# Patient Record
Sex: FEMALE | Race: ASIAN | NOT HISPANIC OR LATINO | Employment: UNEMPLOYED
[De-identification: names, ages, dates, MRNs, and addresses within clinical notes are randomized per-mention and may not be internally consistent; named-entity substitution may affect disease eponyms.]

---
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
## 2018-03-09 ENCOUNTER — LAB SERVICES (OUTPATIENT)
Dept: OTHER | Age: 18
End: 2018-03-09

## 2018-03-09 ENCOUNTER — CHARTING TRANS (OUTPATIENT)
Dept: OTHER | Age: 18
End: 2018-03-09

## 2018-03-10 ENCOUNTER — LAB SERVICES (OUTPATIENT)
Dept: OTHER | Age: 18
End: 2018-03-10

## 2018-03-13 ENCOUNTER — MYAURORA ACCOUNT LINK (OUTPATIENT)
Dept: OTHER | Age: 18
End: 2018-03-13

## 2018-03-13 ENCOUNTER — LAB SERVICES (OUTPATIENT)
Dept: OTHER | Age: 18
End: 2018-03-13

## 2018-03-13 ENCOUNTER — CHARTING TRANS (OUTPATIENT)
Dept: OTHER | Age: 18
End: 2018-03-13

## 2018-03-13 LAB
APPEARANCE: CLEAR
BILIRUBIN: NORMAL
COLOR: YELLOW
GLUCOSE U: NORMAL
KETONES: NORMAL
LEUKOCYTE ESTERASE: NORMAL
NITRITE: NORMAL
OCCULT BLOOD: NORMAL
PH: NORMAL
PROTEIN: NORMAL
URINE SPEC GRAVITY: 1.01
UROBILINOGEN: 3.2

## 2018-03-20 LAB
ABO + RH BLD: ABNORMAL
BACTERIA UR CULT: NORMAL
BASOPHILS # BLD: 0 K/MCL (ref 0–0.3)
BASOPHILS NFR BLD: 0 %
BLD GP AB SCN SERPL QL: NEGATIVE
C TRACH RRNA SPEC QL NAA+PROBE: NEGATIVE
C TRACH RRNA SPEC QL NAA+PROBE: NEGATIVE
DIFFERENTIAL METHOD BLD: ABNORMAL
EOSINOPHIL # BLD: 0.1 K/MCL (ref 0.1–0.5)
EOSINOPHIL NFR BLD: 2 %
ERYTHROCYTE [DISTWIDTH] IN BLOOD: 15.1 % (ref 11–15)
HBV SURFACE AG SER QL: NEGATIVE
HEMATOCRIT: 35.4 % (ref 36–46.5)
HEMOGLOBIN: 11.5 G/DL (ref 12–15.5)
HIV 1+2 AB+HIV1 P24 AG SERPL QL IA: NONREACTIVE
LYMPHOCYTES # BLD: 1.8 K/MCL (ref 1.2–5.2)
LYMPHOCYTES NFR BLD: 24 %
MEAN CORPUSCULAR HEMOGLOBIN: 26.6 PG (ref 26–34)
MEAN CORPUSCULAR HGB CONC: 32.5 G/DL (ref 32–36.5)
MEAN CORPUSCULAR VOLUME: 81.8 FL (ref 78–100)
MONOCYTES # BLD: 0.7 K/MCL (ref 0.3–0.9)
MONOCYTES NFR BLD: 9 %
N GONORRHOEA RRNA SPEC QL NAA+PROBE: NEGATIVE
N GONORRHOEA RRNA SPEC QL NAA+PROBE: NEGATIVE
NEUTROPHILS # BLD: 4.8 K/MCL (ref 1.8–8)
NEUTROPHILS NFR BLD: 65 %
PLATELET COUNT: 275 K/MCL (ref 140–450)
RED CELL COUNT: 4.33 MIL/MCL (ref 4–5.2)
RUBV IGG SERPL IA-ACNC: 341.4 UNITS/ML
SPECIMEN SOURCE: NORMAL
SPECIMEN SOURCE: NORMAL
T PALLIDUM IGG SER QL IA: NONREACTIVE
VZV IGG SER IA-ACNC: ABNORMAL
WHITE BLOOD COUNT: 7.4 K/MCL (ref 4.2–11)

## 2018-04-11 ENCOUNTER — LAB SERVICES (OUTPATIENT)
Dept: OTHER | Age: 18
End: 2018-04-11

## 2018-04-11 ENCOUNTER — CHARTING TRANS (OUTPATIENT)
Dept: OTHER | Age: 18
End: 2018-04-11

## 2018-04-23 LAB
APPEARANCE: NEGATIVE
BILIRUBIN: NEGATIVE
COLOR: NEGATIVE
GLUCOSE U: NEGATIVE
KETONES: NEGATIVE
LEUKOCYTE ESTERASE: NEGATIVE
NITRITE: NEGATIVE
OCCULT BLOOD: NORMAL
PH: 5.5
PROTEIN: NEGATIVE
URINE SPEC GRAVITY: 1
UROBILINOGEN: 3.2

## 2018-05-11 ENCOUNTER — LAB SERVICES (OUTPATIENT)
Dept: OTHER | Age: 18
End: 2018-05-11

## 2018-05-11 ENCOUNTER — CHARTING TRANS (OUTPATIENT)
Dept: OTHER | Age: 18
End: 2018-05-11

## 2018-05-14 LAB
APPEARANCE: CLEAR
BACTERIA UR CULT: NORMAL
BILIRUBIN: NEGATIVE
COLOR: YELLOW
GLUCOSE U: NEGATIVE
KETONES: NEGATIVE
LEUKOCYTE ESTERASE: NORMAL
NITRITE: NORMAL
OCCULT BLOOD: NORMAL
PH: 6
PROTEIN: NEGATIVE
URINE SPEC GRAVITY: 1.01
UROBILINOGEN: 3.2

## 2018-06-12 ENCOUNTER — CHARTING TRANS (OUTPATIENT)
Dept: OTHER | Age: 18
End: 2018-06-12

## 2018-06-12 ENCOUNTER — LAB SERVICES (OUTPATIENT)
Dept: OTHER | Age: 18
End: 2018-06-12

## 2018-06-12 LAB
APPEARANCE: CLEAR
BILIRUBIN: NORMAL
COLOR: YELLOW
GLUCOSE U: NORMAL
KETONES: NORMAL
LEUKOCYTES: NORMAL
NITRITE: NORMAL
OCCULT BLOOD: NORMAL
PH: 7
PROTEIN: NORMAL
URINE SPEC GRAVITY: 1.02
UROBILINOGEN: 3.2

## 2018-07-13 ENCOUNTER — CHARTING TRANS (OUTPATIENT)
Dept: OTHER | Age: 18
End: 2018-07-13

## 2018-07-13 ENCOUNTER — MYAURORA ACCOUNT LINK (OUTPATIENT)
Dept: OTHER | Age: 18
End: 2018-07-13

## 2018-07-14 ENCOUNTER — LAB SERVICES (OUTPATIENT)
Dept: OTHER | Age: 18
End: 2018-07-14

## 2018-07-16 LAB
APPEARANCE: CLEAR
BASOPHILS # BLD: 0 K/MCL (ref 0–0.3)
BASOPHILS NFR BLD: 0 %
BILIRUBIN: NORMAL
COLOR: YELLOW
DIFFERENTIAL METHOD BLD: ABNORMAL
EOSINOPHIL # BLD: 0.1 K/MCL (ref 0.1–0.5)
EOSINOPHIL NFR BLD: 1 %
ERYTHROCYTE [DISTWIDTH] IN BLOOD: 14.1 % (ref 11–15)
GLUCOSE 1H P 50 G GLC PO SERPL-MCNC: 96 MG/DL (ref 65–139)
GLUCOSE U: NORMAL
HEMATOCRIT: 33.3 % (ref 36–46.5)
HEMOGLOBIN: 10.5 G/DL (ref 12–15.5)
IMM GRANULOCYTES # BLD AUTO: 0.8 K/MCL (ref 0–0.2)
IMM GRANULOCYTES NFR BLD: 6 %
KETONES: NORMAL
LEUKOCYTE ESTERASE: NORMAL
LYMPHOCYTES # BLD: 2.2 K/MCL (ref 1.2–5.2)
LYMPHOCYTES NFR BLD: 15 %
MEAN CORPUSCULAR HEMOGLOBIN: 28.5 PG (ref 26–34)
MEAN CORPUSCULAR HGB CONC: 31.5 G/DL (ref 32–36.5)
MEAN CORPUSCULAR VOLUME: 90.2 FL (ref 78–100)
MONOCYTES # BLD: 0.8 K/MCL (ref 0.3–0.9)
MONOCYTES NFR BLD: 6 %
NEUTROPHILS # BLD: 10.4 K/MCL (ref 1.8–8)
NEUTROPHILS NFR BLD: 72 %
NITRITE: NORMAL
NRBC (NRBCRE): 0 /100 WBC
OCCULT BLOOD: NORMAL
PH: 6
PLATELET COUNT: 232 K/MCL (ref 140–450)
PROTEIN: NORMAL
RED CELL COUNT: 3.69 MIL/MCL (ref 4–5.2)
URINE SPEC GRAVITY: <=1.005
UROBILINOGEN: 3.2
WHITE BLOOD COUNT: 14.3 K/MCL (ref 4.2–11)

## 2018-08-13 ENCOUNTER — CHARTING TRANS (OUTPATIENT)
Dept: OTHER | Age: 18
End: 2018-08-13

## 2018-08-14 LAB
BILIRUBIN: NORMAL
GLUCOSE U: NORMAL
KETONES: NORMAL
LEUKOCYTE ESTERASE: NORMAL
NITRITE: NORMAL
OCCULT BLOOD: NORMAL
PH: 6
PROTEIN: NORMAL
URINE SPEC GRAVITY: 1.02
UROBILINOGEN: 3.2

## 2018-08-27 ENCOUNTER — CHARTING TRANS (OUTPATIENT)
Dept: OTHER | Age: 18
End: 2018-08-27

## 2018-08-27 ENCOUNTER — LAB SERVICES (OUTPATIENT)
Dept: OTHER | Age: 18
End: 2018-08-27

## 2018-08-27 LAB
APPEARANCE: NORMAL
BILIRUBIN: NORMAL
COLOR: YELLOW
GLUCOSE U: NORMAL
KETONES: NORMAL
LEUKOCYTES: NORMAL
NITRITE: NORMAL
OCCULT BLOOD: NORMAL
PH: 6
PROTEIN: NORMAL
URINE SPEC GRAVITY: 1
UROBILINOGEN: 3.2

## 2018-08-30 LAB — BACTERIA UR CULT: NORMAL

## 2018-09-11 ENCOUNTER — CHARTING TRANS (OUTPATIENT)
Dept: OTHER | Age: 18
End: 2018-09-11

## 2018-09-11 ENCOUNTER — LAB SERVICES (OUTPATIENT)
Dept: OTHER | Age: 18
End: 2018-09-11

## 2018-09-11 LAB
APPEARANCE: CLEAR
BILIRUBIN: NORMAL
COLOR: YELLOW
GLUCOSE U: NORMAL
KETONES: NORMAL
LEUKOCYTE ESTERASE: NORMAL
NITRITE: NORMAL
OCCULT BLOOD: NORMAL
PH: 6.5
PROTEIN: NORMAL
URINE SPEC GRAVITY: 1.01
UROBILINOGEN: 3.2

## 2018-09-13 LAB
BACTERIA UR CULT: NORMAL
BASOPHIL: 0 %
BASOPHILS # BLD: 0 K/MCL (ref 0–0.3)
C TRACH RRNA SPEC QL NAA+PROBE: NEGATIVE
DIFFERENTIAL METHOD BLD: ABNORMAL
EOSINOPHIL # BLD: 0.3 K/MCL (ref 0.1–0.5)
EOSINOPHIL NFR BLD: 2 %
ERYTHROCYTE [DISTWIDTH] IN BLOOD: 14 % (ref 11–15)
HEMATOCRIT: 36.2 % (ref 36–46.5)
HEMOGLOBIN: 11.5 G/DL (ref 12–15.5)
HIV 1+2 AB+HIV1 P24 AG SERPL QL IA: NONREACTIVE
LYMPHOCYTES # BLD: 1.8 K/MCL (ref 1.2–5.2)
LYMPHOCYTES NFR BLD: 13 %
MEAN CORPUSCULAR HEMOGLOBIN: 28.5 PG (ref 26–34)
MEAN CORPUSCULAR HGB CONC: 31.8 G/DL (ref 32–36.5)
MEAN CORPUSCULAR VOLUME: 89.6 FL (ref 78–100)
MONOCYTES # BLD: 0.9 K/MCL (ref 0.3–0.9)
MONOCYTES NFR BLD: 6 %
N GONORRHOEA RRNA SPEC QL NAA+PROBE: NEGATIVE
NEUTROPHILS # BLD: 11.2 K/MCL (ref 1.8–8)
NEUTS BAND NFR BLD: 3 % (ref 0–10)
NEUTS SEG NFR BLD: 76 %
NRBC (NRBCRE): 0 /100 WBC
PLAT MORPH BLD: NORMAL
PLATELET COUNT: 238 K/MCL (ref 140–450)
RBC MORPH BLD: NORMAL
RED CELL COUNT: 4.04 MIL/MCL (ref 4–5.2)
RPR SER QL: NONREACTIVE
SPECIMEN SOURCE: NORMAL
WHITE BLOOD COUNT: 14.2 K/MCL (ref 4.2–11)

## 2018-09-26 ENCOUNTER — LAB SERVICES (OUTPATIENT)
Dept: OTHER | Age: 18
End: 2018-09-26

## 2018-09-26 ENCOUNTER — CHARTING TRANS (OUTPATIENT)
Dept: OTHER | Age: 18
End: 2018-09-26

## 2018-09-26 LAB
BILIRUBIN: NEGATIVE
COLOR: YELLOW
GLUCOSE U: NEGATIVE
KETONES: NEGATIVE
LEUKOCYTE ESTERASE: NORMAL
NITRITE: NEGATIVE
OCCULT BLOOD: NORMAL
PH: 6
PROTEIN: NEGATIVE
URINE SPEC GRAVITY: 1.01
UROBILINOGEN: 3.2

## 2018-09-28 ENCOUNTER — CHARTING TRANS (OUTPATIENT)
Dept: OTHER | Age: 18
End: 2018-09-28

## 2018-09-30 ENCOUNTER — CHARTING TRANS (OUTPATIENT)
Dept: OTHER | Age: 18
End: 2018-09-30

## 2018-10-01 LAB
BACTERIA UR CULT: NORMAL
GP B STREP CULT/SENS (GBSCS) HL: NORMAL

## 2018-10-05 ENCOUNTER — MYAURORA ACCOUNT LINK (OUTPATIENT)
Dept: OTHER | Age: 18
End: 2018-10-05

## 2018-10-05 ENCOUNTER — CHARTING TRANS (OUTPATIENT)
Dept: OTHER | Age: 18
End: 2018-10-05

## 2018-10-05 ENCOUNTER — LAB SERVICES (OUTPATIENT)
Dept: OTHER | Age: 18
End: 2018-10-05

## 2018-10-12 ENCOUNTER — CHARTING TRANS (OUTPATIENT)
Dept: OTHER | Age: 18
End: 2018-10-12

## 2018-10-12 ENCOUNTER — MYAURORA ACCOUNT LINK (OUTPATIENT)
Dept: OTHER | Age: 18
End: 2018-10-12

## 2018-10-12 ENCOUNTER — LAB SERVICES (OUTPATIENT)
Dept: OTHER | Age: 18
End: 2018-10-12

## 2018-10-17 LAB
BILIRUBIN: NEGATIVE
COLOR: YELLOW
GLUCOSE U: NEGATIVE
KETONES: NEGATIVE
LEUKOCYTE ESTERASE: NORMAL
NITRITE: NEGATIVE
OCCULT BLOOD: NORMAL
PH: 7
PROTEIN: NEGATIVE
URINE SPEC GRAVITY: 1.01
UROBILINOGEN: 3.2

## 2018-10-22 ENCOUNTER — CHARTING TRANS (OUTPATIENT)
Dept: OTHER | Age: 18
End: 2018-10-22

## 2018-10-22 ENCOUNTER — LAB SERVICES (OUTPATIENT)
Dept: OTHER | Age: 18
End: 2018-10-22

## 2018-10-28 ENCOUNTER — HOSPITAL (OUTPATIENT)
Dept: OTHER | Age: 18
End: 2018-10-28
Attending: FAMILY MEDICINE

## 2018-10-29 ENCOUNTER — CHARTING TRANS (OUTPATIENT)
Dept: OTHER | Age: 18
End: 2018-10-29

## 2018-10-29 ENCOUNTER — HOSPITAL (OUTPATIENT)
Dept: OTHER | Age: 18
End: 2018-10-29
Attending: OBSTETRICS & GYNECOLOGY

## 2018-10-29 ENCOUNTER — MYAURORA ACCOUNT LINK (OUTPATIENT)
Dept: OTHER | Age: 18
End: 2018-10-29

## 2018-10-29 ENCOUNTER — LAB SERVICES (OUTPATIENT)
Dept: OTHER | Age: 18
End: 2018-10-29

## 2018-10-29 LAB
ANALYZER ANC (IANC): ABNORMAL
APPEARANCE UR: CLEAR
APPEARANCE: CLEAR
BILIRUB UR QL STRIP: NEGATIVE
BILIRUBIN: NORMAL
COLOR UR: YELLOW
COLOR: YELLOW
ERYTHROCYTE [DISTWIDTH] IN BLOOD: 13.7 % (ref 11–15)
GLUCOSE U: NORMAL
GLUCOSE UR STRIP-MCNC: NEGATIVE MG/DL
HEMATOCRIT: 39 % (ref 36–46.5)
HEMOCCULT STL QL: ABNORMAL
HGB BLD-MCNC: 12.6 GM/DL (ref 12–15.5)
KETONES UR STRIP-MCNC: NEGATIVE MG/DL
KETONES: NORMAL
LEUKOCYTE ESTERASE UR QL STRIP: ABNORMAL
LEUKOCYTE ESTERASE: NORMAL
MCH RBC QN AUTO: 28.4 PG (ref 26–34)
MCHC RBC AUTO-ENTMCNC: 32.3 GM/DL (ref 32–36.5)
MCV RBC AUTO: 88 FL (ref 78–100)
NITRITE UR QL STRIP: NEGATIVE
NITRITE: NORMAL
NRBC (NRBCRE): 0 /100 WBC
OCCULT BLOOD: NORMAL
PH UR STRIP: 6.5 UNIT (ref 5–7)
PH: 7
PLATELET # BLD: 206 THOUSAND/MCL (ref 140–450)
PROT UR STRIP-MCNC: NEGATIVE MG/DL
PROTEIN: NORMAL
RBC # BLD: 4.43 MILLION/MCL (ref 4–5.2)
SP GR UR STRIP: 1.01 (ref 1–1.03)
URINE SPEC GRAVITY: 1.01
UROBILINOGEN UR STRIP-MCNC: 0.2 MG/DL (ref 0–1)
UROBILINOGEN: 3.2
WBC # BLD: 14.6 THOUSAND/MCL (ref 4.2–11)

## 2018-10-30 LAB
BASE DEFICIT BLDCOV-SCNC: 10 MMOL/L
BASE DEFICIT BLDCOV-SCNC: 8 MMOL/L
BASE EXCESS BLDCOV CALC-SCNC: ABNORMAL MMOL/L
BASE EXCESS BLDCOV CALC-SCNC: ABNORMAL MMOL/L
GLUCOSE BLDC GLUCOMTR-MCNC: 80 MG/DL (ref 65–99)
HCO3 BLDCOA-SCNC: 25 MMOL/L (ref 21–28)
HCO3 BLDCOV-SCNC: 26 MMOL/L (ref 22–29)
PCO2 BLDCOA: 100 MM HG (ref 31–74)
PCO2 BLDCOV: 96 MM HG (ref 23–49)
PH BLDCOA: 7.03 UNIT (ref 7.18–7.38)
PH BLDCOV: 7.07 UNIT (ref 7.25–7.45)
PO2 BLDCOA: <25 MM HG (ref 6–31)
PO2 BLDCOV: <25 MM HG (ref 17–41)

## 2018-10-31 VITALS
HEIGHT: 63 IN | WEIGHT: 147 LBS | SYSTOLIC BLOOD PRESSURE: 110 MMHG | BODY MASS INDEX: 26.05 KG/M2 | TEMPERATURE: 97 F | DIASTOLIC BLOOD PRESSURE: 73 MMHG | HEART RATE: 94 BPM

## 2018-10-31 LAB
ANALYZER ANC (IANC): ABNORMAL
ERYTHROCYTE [DISTWIDTH] IN BLOOD: 13.8 % (ref 11–15)
HEMATOCRIT: 29.8 % (ref 36–46.5)
HGB BLD-MCNC: 9.6 GM/DL (ref 12–15.5)
MCH RBC QN AUTO: 28.5 PG (ref 26–34)
MCHC RBC AUTO-ENTMCNC: 32.2 GM/DL (ref 32–36.5)
MCV RBC AUTO: 88.4 FL (ref 78–100)
NRBC (NRBCRE): 0 /100 WBC
PLATELET # BLD: 186 THOUSAND/MCL (ref 140–450)
RBC # BLD: 3.37 MILLION/MCL (ref 4–5.2)
WBC # BLD: 19.5 THOUSAND/MCL (ref 4.2–11)

## 2018-11-01 VITALS
WEIGHT: 132 LBS | TEMPERATURE: 97.4 F | DIASTOLIC BLOOD PRESSURE: 72 MMHG | HEIGHT: 63 IN | BODY MASS INDEX: 23.39 KG/M2 | HEART RATE: 99 BPM | SYSTOLIC BLOOD PRESSURE: 107 MMHG

## 2018-11-01 VITALS
SYSTOLIC BLOOD PRESSURE: 116 MMHG | WEIGHT: 130 LBS | HEIGHT: 63 IN | HEART RATE: 97 BPM | DIASTOLIC BLOOD PRESSURE: 72 MMHG | BODY MASS INDEX: 23.04 KG/M2 | TEMPERATURE: 97.8 F

## 2018-11-01 VITALS
BODY MASS INDEX: 23.21 KG/M2 | HEIGHT: 63 IN | WEIGHT: 131 LBS | HEART RATE: 87 BPM | DIASTOLIC BLOOD PRESSURE: 64 MMHG | TEMPERATURE: 98 F | SYSTOLIC BLOOD PRESSURE: 107 MMHG

## 2018-11-01 VITALS
DIASTOLIC BLOOD PRESSURE: 75 MMHG | HEART RATE: 92 BPM | TEMPERATURE: 97.6 F | WEIGHT: 140 LBS | SYSTOLIC BLOOD PRESSURE: 112 MMHG

## 2018-11-01 VITALS
HEART RATE: 80 BPM | SYSTOLIC BLOOD PRESSURE: 109 MMHG | TEMPERATURE: 97.8 F | WEIGHT: 127 LBS | DIASTOLIC BLOOD PRESSURE: 70 MMHG

## 2018-11-01 LAB
ANALYZER ANC (IANC): ABNORMAL
BASOPHILS # BLD: 0 THOUSAND/MCL (ref 0–0.3)
BASOPHILS NFR BLD: 0 %
DIFFERENTIAL METHOD BLD: ABNORMAL
EOSINOPHIL # BLD: 0.3 THOUSAND/MCL (ref 0.1–0.5)
EOSINOPHIL NFR BLD: 2 %
ERYTHROCYTE [DISTWIDTH] IN BLOOD: 13.9 % (ref 11–15)
HEMATOCRIT: 31.7 % (ref 36–46.5)
HGB BLD-MCNC: 9.9 GM/DL (ref 12–15.5)
IMM GRANULOCYTES # BLD AUTO: 0.2 THOUSAND/MCL (ref 0–0.2)
IMM GRANULOCYTES NFR BLD: 2 %
LYMPHOCYTES # BLD: 1.9 THOUSAND/MCL (ref 1.2–5.2)
LYMPHOCYTES NFR BLD: 14 %
MCH RBC QN AUTO: 28.3 PG (ref 26–34)
MCHC RBC AUTO-ENTMCNC: 31.2 GM/DL (ref 32–36.5)
MCV RBC AUTO: 90.6 FL (ref 78–100)
MONOCYTES # BLD: 0.7 THOUSAND/MCL (ref 0.3–0.9)
MONOCYTES NFR BLD: 5 %
NEUTROPHILS # BLD: 10.3 THOUSAND/MCL (ref 1.8–8)
NEUTROPHILS NFR BLD: 77 %
NEUTS SEG NFR BLD: ABNORMAL %
NRBC (NRBCRE): 0 /100 WBC
PLATELET # BLD: 209 THOUSAND/MCL (ref 140–450)
RBC # BLD: 3.5 MILLION/MCL (ref 4–5.2)
WBC # BLD: 13.5 THOUSAND/MCL (ref 4.2–11)

## 2018-11-02 VITALS
WEIGHT: 116 LBS | HEIGHT: 63 IN | SYSTOLIC BLOOD PRESSURE: 100 MMHG | BODY MASS INDEX: 20.55 KG/M2 | HEART RATE: 83 BPM | TEMPERATURE: 97.2 F | DIASTOLIC BLOOD PRESSURE: 62 MMHG

## 2018-11-02 VITALS
TEMPERATURE: 98.1 F | HEART RATE: 75 BPM | SYSTOLIC BLOOD PRESSURE: 106 MMHG | DIASTOLIC BLOOD PRESSURE: 60 MMHG | RESPIRATION RATE: 12 BRPM

## 2018-11-20 ENCOUNTER — CHARTING TRANS (OUTPATIENT)
Dept: OTHER | Age: 18
End: 2018-11-20

## 2018-11-27 VITALS
DIASTOLIC BLOOD PRESSURE: 68 MMHG | TEMPERATURE: 97.2 F | BODY MASS INDEX: 29.27 KG/M2 | WEIGHT: 165.2 LBS | HEART RATE: 97 BPM | HEIGHT: 63 IN | SYSTOLIC BLOOD PRESSURE: 107 MMHG

## 2018-11-27 VITALS
DIASTOLIC BLOOD PRESSURE: 73 MMHG | BODY MASS INDEX: 27.84 KG/M2 | HEART RATE: 109 BPM | SYSTOLIC BLOOD PRESSURE: 117 MMHG | HEIGHT: 63 IN | TEMPERATURE: 98.1 F | WEIGHT: 157.13 LBS

## 2018-11-27 VITALS
HEART RATE: 92 BPM | BODY MASS INDEX: 30.3 KG/M2 | HEIGHT: 63 IN | WEIGHT: 171 LBS | SYSTOLIC BLOOD PRESSURE: 114 MMHG | TEMPERATURE: 98.3 F | DIASTOLIC BLOOD PRESSURE: 72 MMHG

## 2018-11-27 VITALS
TEMPERATURE: 96.9 F | DIASTOLIC BLOOD PRESSURE: 76 MMHG | WEIGHT: 169 LBS | HEART RATE: 96 BPM | SYSTOLIC BLOOD PRESSURE: 117 MMHG

## 2018-11-27 VITALS
TEMPERATURE: 97 F | HEART RATE: 80 BPM | SYSTOLIC BLOOD PRESSURE: 105 MMHG | DIASTOLIC BLOOD PRESSURE: 69 MMHG | WEIGHT: 171 LBS

## 2018-11-27 VITALS
DIASTOLIC BLOOD PRESSURE: 71 MMHG | HEIGHT: 63 IN | SYSTOLIC BLOOD PRESSURE: 113 MMHG | WEIGHT: 167 LBS | BODY MASS INDEX: 29.59 KG/M2 | TEMPERATURE: 96.8 F | HEART RATE: 87 BPM

## 2018-11-27 VITALS
HEIGHT: 63 IN | HEART RATE: 89 BPM | DIASTOLIC BLOOD PRESSURE: 69 MMHG | TEMPERATURE: 96.9 F | WEIGHT: 170 LBS | BODY MASS INDEX: 30.12 KG/M2 | SYSTOLIC BLOOD PRESSURE: 107 MMHG

## 2018-11-27 VITALS
TEMPERATURE: 96.6 F | WEIGHT: 160 LBS | SYSTOLIC BLOOD PRESSURE: 119 MMHG | HEART RATE: 102 BPM | DIASTOLIC BLOOD PRESSURE: 76 MMHG

## 2018-12-10 ENCOUNTER — TELEPHONE (OUTPATIENT)
Dept: SCHEDULING | Age: 18
End: 2018-12-10

## 2018-12-13 PROBLEM — Z3A.40 40 WEEKS GESTATION OF PREGNANCY: Status: ACTIVE | Noted: 2018-07-16

## 2018-12-13 PROBLEM — Z28.39 MATERNAL VARICELLA, NON-IMMUNE: Status: ACTIVE | Noted: 2018-03-20

## 2018-12-13 PROBLEM — O09.899 MATERNAL VARICELLA, NON-IMMUNE: Status: ACTIVE | Noted: 2018-03-20

## 2018-12-13 RX ORDER — ALBUTEROL SULFATE 90 UG/1
2 AEROSOL, METERED RESPIRATORY (INHALATION) EVERY 4 HOURS PRN
COMMUNITY
Start: 2018-04-11 | End: 2019-04-11

## 2018-12-13 ASSESSMENT — ENCOUNTER SYMPTOMS
CONSTIPATION: 0
ABDOMINAL PAIN: 0
DIZZINESS: 0
SHORTNESS OF BREATH: 0
FEVER: 0
SLEEP DISTURBANCE: 0
VOMITING: 0
HALLUCINATIONS: 0
HEADACHES: 0
COLOR CHANGE: 0
NAUSEA: 0
FATIGUE: 0
CHILLS: 0
DIARRHEA: 0
BLOOD IN STOOL: 0
WOUND: 0

## 2018-12-14 ENCOUNTER — POSTPARTUM VISIT (OUTPATIENT)
Dept: FAMILY MEDICINE | Age: 18
End: 2018-12-14

## 2018-12-14 VITALS
TEMPERATURE: 97.5 F | WEIGHT: 145 LBS | HEART RATE: 72 BPM | SYSTOLIC BLOOD PRESSURE: 102 MMHG | BODY MASS INDEX: 24.16 KG/M2 | HEIGHT: 65 IN | DIASTOLIC BLOOD PRESSURE: 67 MMHG

## 2018-12-14 PROCEDURE — 0503F POSTPARTUM CARE VISIT: CPT | Performed by: FAMILY MEDICINE

## 2018-12-14 PROCEDURE — 96127 BRIEF EMOTIONAL/BEHAV ASSMT: CPT | Performed by: FAMILY MEDICINE

## 2018-12-14 SDOH — HEALTH STABILITY: MENTAL HEALTH: HOW OFTEN DO YOU HAVE A DRINK CONTAINING ALCOHOL?: NEVER

## 2018-12-27 ENCOUNTER — E-ADVICE (OUTPATIENT)
Dept: FAMILY MEDICINE | Age: 18
End: 2018-12-27

## 2018-12-29 ENCOUNTER — E-ADVICE (OUTPATIENT)
Dept: FAMILY MEDICINE | Age: 18
End: 2018-12-29

## 2018-12-31 ENCOUNTER — TELEPHONE (OUTPATIENT)
Dept: FAMILY MEDICINE | Age: 18
End: 2018-12-31

## 2019-01-01 ENCOUNTER — EXTERNAL RECORD (OUTPATIENT)
Dept: HEALTH INFORMATION MANAGEMENT | Facility: OTHER | Age: 19
End: 2019-01-01

## 2019-01-17 ENCOUNTER — E-ADVICE (OUTPATIENT)
Dept: FAMILY MEDICINE | Age: 19
End: 2019-01-17

## 2019-02-05 ENCOUNTER — TELEPHONE (OUTPATIENT)
Dept: SCHEDULING | Age: 19
End: 2019-02-05

## 2019-02-07 RX ORDER — PNV NO.95/FERROUS FUM/FOLIC AC 28MG-0.8MG
TABLET ORAL
COMMUNITY
End: 2020-01-17 | Stop reason: ALTCHOICE

## 2019-02-11 PROBLEM — N93.9 VAGINAL BLEEDING: Status: ACTIVE | Noted: 2019-02-11

## 2019-03-25 ENCOUNTER — E-ADVICE (OUTPATIENT)
Dept: FAMILY MEDICINE | Age: 19
End: 2019-03-25

## 2019-03-25 DIAGNOSIS — N93.9 VAGINAL BLEEDING: Primary | ICD-10-CM

## 2019-03-26 ENCOUNTER — TELEPHONE (OUTPATIENT)
Dept: FAMILY MEDICINE | Age: 19
End: 2019-03-26

## 2019-03-27 ENCOUNTER — E-ADVICE (OUTPATIENT)
Dept: FAMILY MEDICINE | Age: 19
End: 2019-03-27

## 2019-03-27 ENCOUNTER — TELEPHONE (OUTPATIENT)
Dept: FAMILY MEDICINE | Age: 19
End: 2019-03-27

## 2019-03-27 ENCOUNTER — APPOINTMENT (OUTPATIENT)
Dept: FAMILY MEDICINE | Age: 19
End: 2019-03-27

## 2019-05-16 ENCOUNTER — TELEPHONE (OUTPATIENT)
Dept: FAMILY MEDICINE | Age: 19
End: 2019-05-16

## 2019-05-28 ENCOUNTER — TELEPHONE (OUTPATIENT)
Dept: SCHEDULING | Age: 19
End: 2019-05-28

## 2019-06-24 ENCOUNTER — TELEPHONE (OUTPATIENT)
Dept: SCHEDULING | Age: 19
End: 2019-06-24

## 2019-06-24 ENCOUNTER — WALK IN (OUTPATIENT)
Dept: URGENT CARE | Age: 19
End: 2019-06-24

## 2019-06-24 DIAGNOSIS — H60.502 ACUTE OTITIS EXTERNA OF LEFT EAR, UNSPECIFIED TYPE: Primary | ICD-10-CM

## 2019-06-24 PROCEDURE — 99214 OFFICE O/P EST MOD 30 MIN: CPT | Performed by: NURSE PRACTITIONER

## 2019-06-24 RX ORDER — OFLOXACIN 3 MG/ML
10 SOLUTION AURICULAR (OTIC) DAILY
Qty: 5 ML | Refills: 0 | Status: SHIPPED | OUTPATIENT
Start: 2019-06-24 | End: 2019-07-01

## 2019-06-24 ASSESSMENT — ENCOUNTER SYMPTOMS
TROUBLE SWALLOWING: 0
UNEXPECTED WEIGHT CHANGE: 0
PSYCHIATRIC NEGATIVE: 1
DIAPHORESIS: 0
GASTROINTESTINAL NEGATIVE: 1
HEMATOLOGIC/LYMPHATIC NEGATIVE: 1
CONSTITUTIONAL NEGATIVE: 1
FATIGUE: 0
NEUROLOGICAL NEGATIVE: 1
SORE THROAT: 0
FACIAL SWELLING: 0
CHILLS: 0
SINUS PRESSURE: 0
VOICE CHANGE: 0
RESPIRATORY NEGATIVE: 1
SINUS PAIN: 0
RHINORRHEA: 0
EYES NEGATIVE: 1

## 2019-06-24 ASSESSMENT — PAIN SCALES - GENERAL: PAINLEVEL: 5-6

## 2019-07-02 ENCOUNTER — E-ADVICE (OUTPATIENT)
Dept: FAMILY MEDICINE | Age: 19
End: 2019-07-02

## 2019-07-03 ENCOUNTER — E-ADVICE (OUTPATIENT)
Dept: FAMILY MEDICINE | Age: 19
End: 2019-07-03

## 2020-01-16 ENCOUNTER — E-ADVICE (OUTPATIENT)
Dept: FAMILY MEDICINE | Age: 20
End: 2020-01-16

## 2020-01-17 ENCOUNTER — OFFICE VISIT (OUTPATIENT)
Dept: INTERNAL MEDICINE | Age: 20
End: 2020-01-17

## 2020-01-17 VITALS — DIASTOLIC BLOOD PRESSURE: 54 MMHG | SYSTOLIC BLOOD PRESSURE: 97 MMHG | HEART RATE: 71 BPM

## 2020-01-17 DIAGNOSIS — N93.9 VAGINAL BLEEDING: Primary | ICD-10-CM

## 2020-01-17 DIAGNOSIS — D64.9 ANEMIA, UNSPECIFIED TYPE: ICD-10-CM

## 2020-01-17 PROCEDURE — 99213 OFFICE O/P EST LOW 20 MIN: CPT | Performed by: STUDENT IN AN ORGANIZED HEALTH CARE EDUCATION/TRAINING PROGRAM

## 2021-05-26 VITALS
DIASTOLIC BLOOD PRESSURE: 60 MMHG | RESPIRATION RATE: 16 BRPM | BODY MASS INDEX: 23.07 KG/M2 | WEIGHT: 138.45 LBS | TEMPERATURE: 97.9 F | HEIGHT: 65 IN | HEART RATE: 75 BPM | SYSTOLIC BLOOD PRESSURE: 98 MMHG

## 2021-10-20 ENCOUNTER — TELEPHONE (OUTPATIENT)
Dept: SCHEDULING | Age: 21
End: 2021-10-20

## 2021-10-21 ENCOUNTER — APPOINTMENT (OUTPATIENT)
Dept: INTERNAL MEDICINE | Age: 21
End: 2021-10-21

## 2021-10-22 ENCOUNTER — E-ADVICE (OUTPATIENT)
Dept: FAMILY MEDICINE | Age: 21
End: 2021-10-22

## 2021-10-22 ENCOUNTER — TELEPHONE (OUTPATIENT)
Dept: FAMILY MEDICINE | Age: 21
End: 2021-10-22

## 2021-10-22 ENCOUNTER — TELEPHONE (OUTPATIENT)
Dept: SCHEDULING | Age: 21
End: 2021-10-22

## 2021-10-22 ENCOUNTER — OFFICE VISIT (OUTPATIENT)
Dept: FAMILY MEDICINE | Age: 21
End: 2021-10-22

## 2021-10-22 VITALS
HEART RATE: 79 BPM | SYSTOLIC BLOOD PRESSURE: 124 MMHG | TEMPERATURE: 97.9 F | DIASTOLIC BLOOD PRESSURE: 76 MMHG | HEIGHT: 65 IN | OXYGEN SATURATION: 98 % | BODY MASS INDEX: 24.83 KG/M2 | WEIGHT: 149 LBS

## 2021-10-22 DIAGNOSIS — O20.9 VAGINAL BLEEDING IN PREGNANCY, FIRST TRIMESTER: Primary | ICD-10-CM

## 2021-10-22 PROBLEM — Z3A.40 40 WEEKS GESTATION OF PREGNANCY: Status: RESOLVED | Noted: 2018-07-16 | Resolved: 2021-10-22

## 2021-10-22 PROCEDURE — 87491 CHLMYD TRACH DNA AMP PROBE: CPT | Performed by: PSYCHIATRY & NEUROLOGY

## 2021-10-22 PROCEDURE — 99213 OFFICE O/P EST LOW 20 MIN: CPT | Performed by: FAMILY MEDICINE

## 2021-10-22 PROCEDURE — 87661 TRICHOMONAS VAGINALIS AMPLIF: CPT | Performed by: PSYCHIATRY & NEUROLOGY

## 2021-10-22 PROCEDURE — 87624 HPV HI-RISK TYP POOLED RSLT: CPT | Performed by: CLINICAL MEDICAL LABORATORY

## 2021-10-22 PROCEDURE — 90471 IMMUNIZATION ADMIN: CPT | Performed by: FAMILY MEDICINE

## 2021-10-22 PROCEDURE — 87591 N.GONORRHOEAE DNA AMP PROB: CPT | Performed by: PSYCHIATRY & NEUROLOGY

## 2021-10-22 PROCEDURE — 87086 URINE CULTURE/COLONY COUNT: CPT | Performed by: FAMILY MEDICINE

## 2021-10-22 PROCEDURE — 88175 CYTOPATH C/V AUTO FLUID REDO: CPT | Performed by: CLINICAL MEDICAL LABORATORY

## 2021-10-22 PROCEDURE — 90686 IIV4 VACC NO PRSV 0.5 ML IM: CPT | Performed by: FAMILY MEDICINE

## 2021-10-22 PROCEDURE — 81001 URINALYSIS AUTO W/SCOPE: CPT | Performed by: FAMILY MEDICINE

## 2021-10-22 ASSESSMENT — PAIN SCALES - GENERAL: PAINLEVEL: 2

## 2021-10-22 ASSESSMENT — PATIENT HEALTH QUESTIONNAIRE - PHQ9
1. LITTLE INTEREST OR PLEASURE IN DOING THINGS: NOT AT ALL
SUM OF ALL RESPONSES TO PHQ9 QUESTIONS 1 AND 2: 0
2. FEELING DOWN, DEPRESSED OR HOPELESS: NOT AT ALL
SUM OF ALL RESPONSES TO PHQ9 QUESTIONS 1 AND 2: 0
CLINICAL INTERPRETATION OF PHQ2 SCORE: NO FURTHER SCREENING NEEDED
CLINICAL INTERPRETATION OF PHQ9 SCORE: NO FURTHER SCREENING NEEDED
SUM OF ALL RESPONSES TO PHQ9 QUESTIONS 1 AND 2: 0

## 2021-10-23 LAB
AMORPH SED URNS QL MICRO: PRESENT
APPEARANCE UR: ABNORMAL
BACTERIA #/AREA URNS HPF: ABNORMAL /HPF
BILIRUB UR QL STRIP: NEGATIVE
COLOR UR: YELLOW
GLUCOSE UR STRIP-MCNC: NEGATIVE MG/DL
HGB UR QL STRIP: ABNORMAL
HYALINE CASTS #/AREA URNS LPF: ABNORMAL /LPF
KETONES UR STRIP-MCNC: NEGATIVE MG/DL
LEUKOCYTE ESTERASE UR QL STRIP: NEGATIVE
MUCOUS THREADS URNS QL MICRO: PRESENT
NITRITE UR QL STRIP: NEGATIVE
PH UR STRIP: 7 [PH] (ref 5–7)
PROT UR STRIP-MCNC: NEGATIVE MG/DL
RBC #/AREA URNS HPF: ABNORMAL /HPF
SP GR UR STRIP: 1.02 (ref 1–1.03)
SQUAMOUS #/AREA URNS HPF: ABNORMAL /HPF
UROBILINOGEN UR STRIP-MCNC: 0.2 MG/DL
WBC #/AREA URNS HPF: ABNORMAL /HPF

## 2021-10-24 LAB — BACTERIA UR CULT: NORMAL

## 2021-10-25 ENCOUNTER — LAB SERVICES (OUTPATIENT)
Dept: LAB | Age: 21
End: 2021-10-25

## 2021-10-25 ENCOUNTER — OB CHECK (OUTPATIENT)
Dept: FAMILY MEDICINE | Age: 21
End: 2021-10-25

## 2021-10-25 ENCOUNTER — TELEPHONE (OUTPATIENT)
Dept: FAMILY MEDICINE | Age: 21
End: 2021-10-25

## 2021-10-25 VITALS
HEART RATE: 79 BPM | HEIGHT: 65 IN | DIASTOLIC BLOOD PRESSURE: 60 MMHG | BODY MASS INDEX: 24.66 KG/M2 | TEMPERATURE: 97.8 F | SYSTOLIC BLOOD PRESSURE: 115 MMHG | WEIGHT: 148 LBS

## 2021-10-25 DIAGNOSIS — O20.9 VAGINAL BLEEDING IN PREGNANCY, FIRST TRIMESTER: ICD-10-CM

## 2021-10-25 DIAGNOSIS — Z3A.09 9 WEEKS GESTATION OF PREGNANCY: Primary | ICD-10-CM

## 2021-10-25 LAB
ABO + RH BLD: NORMAL
APPEARANCE, POC: ABNORMAL
BASOPHILS # BLD: 0 K/MCL (ref 0–0.3)
BASOPHILS NFR BLD: 0 %
BILIRUBIN, POC: NEGATIVE
BLD GP AB SCN SERPL QL GEL: NEGATIVE
C TRACH RRNA CVX QL NAA+PROBE: NEGATIVE
COLOR, POC: YELLOW
DEPRECATED RDW RBC: 43.8 FL (ref 39–50)
EOSINOPHIL # BLD: 0.1 K/MCL (ref 0–0.5)
EOSINOPHIL NFR BLD: 1 %
ERYTHROCYTE [DISTWIDTH] IN BLOOD: 14.1 % (ref 11–15)
GLUCOSE UR-MCNC: NEGATIVE MG/DL
HBV SURFACE AG SER QL: NEGATIVE
HCT VFR BLD CALC: 38.2 % (ref 36–46.5)
HCV AB SER QL: NEGATIVE
HGB BLD-MCNC: 12 G/DL (ref 12–15.5)
HIV 1+2 AB+HIV1 P24 AG SERPL QL IA: NONREACTIVE
IMM GRANULOCYTES # BLD AUTO: 0 K/MCL (ref 0–0.2)
IMM GRANULOCYTES # BLD: 1 %
KETONES, POC: NEGATIVE MG/DL
LYMPHOCYTES # BLD: 2.2 K/MCL (ref 1–4.8)
LYMPHOCYTES NFR BLD: 25 %
Lab: NORMAL
MCH RBC QN AUTO: 26.8 PG (ref 26–34)
MCHC RBC AUTO-ENTMCNC: 31.4 G/DL (ref 32–36.5)
MCV RBC AUTO: 85.3 FL (ref 78–100)
MONOCYTES # BLD: 0.6 K/MCL (ref 0.3–0.9)
MONOCYTES NFR BLD: 7 %
N GONORRHOEA RRNA CVX QL NAA+PROBE: NEGATIVE
NEUTROPHILS # BLD: 5.7 K/MCL (ref 1.8–7.7)
NEUTROPHILS NFR BLD: 66 %
NITRITE, POC: NEGATIVE
NRBC BLD MANUAL-RTO: 0 /100 WBC
OCCULT BLOOD, POC: ABNORMAL
PH UR: 8.5 [PH] (ref 5–7)
PLATELET # BLD AUTO: 255 K/MCL (ref 140–450)
PROT UR-MCNC: NEGATIVE MG/DL
RBC # BLD: 4.48 MIL/MCL (ref 4–5.2)
RUBV IGG SERPL IA-ACNC: 295.6 UNITS/ML
SP GR UR: 1.02 (ref 1–1.03)
T VAGINALIS RRNA CVX QL NAA+PROBE: NEGATIVE
UROBILINOGEN UR-MCNC: 3.5 MG/DL (ref 0–1)
WBC # BLD: 8.6 K/MCL (ref 4.2–11)
WBC (LEUKOCYTE) ESTERASE, POC: ABNORMAL

## 2021-10-25 PROCEDURE — 85025 COMPLETE CBC W/AUTO DIFF WBC: CPT | Performed by: PSYCHIATRY & NEUROLOGY

## 2021-10-25 PROCEDURE — 86901 BLOOD TYPING SEROLOGIC RH(D): CPT | Performed by: PSYCHIATRY & NEUROLOGY

## 2021-10-25 PROCEDURE — 87086 URINE CULTURE/COLONY COUNT: CPT | Performed by: PSYCHIATRY & NEUROLOGY

## 2021-10-25 PROCEDURE — 81003 URINALYSIS AUTO W/O SCOPE: CPT | Performed by: STUDENT IN AN ORGANIZED HEALTH CARE EDUCATION/TRAINING PROGRAM

## 2021-10-25 PROCEDURE — 86787 VARICELLA-ZOSTER ANTIBODY: CPT | Performed by: PSYCHIATRY & NEUROLOGY

## 2021-10-25 PROCEDURE — 36415 COLL VENOUS BLD VENIPUNCTURE: CPT | Performed by: FAMILY MEDICINE

## 2021-10-25 PROCEDURE — 86762 RUBELLA ANTIBODY: CPT | Performed by: PSYCHIATRY & NEUROLOGY

## 2021-10-25 PROCEDURE — 86900 BLOOD TYPING SEROLOGIC ABO: CPT | Performed by: PSYCHIATRY & NEUROLOGY

## 2021-10-25 PROCEDURE — 87340 HEPATITIS B SURFACE AG IA: CPT | Performed by: PSYCHIATRY & NEUROLOGY

## 2021-10-25 PROCEDURE — 99213 OFFICE O/P EST LOW 20 MIN: CPT | Performed by: STUDENT IN AN ORGANIZED HEALTH CARE EDUCATION/TRAINING PROGRAM

## 2021-10-25 PROCEDURE — 86803 HEPATITIS C AB TEST: CPT | Performed by: PSYCHIATRY & NEUROLOGY

## 2021-10-25 PROCEDURE — 87389 HIV-1 AG W/HIV-1&-2 AB AG IA: CPT | Performed by: PSYCHIATRY & NEUROLOGY

## 2021-10-25 PROCEDURE — 86780 TREPONEMA PALLIDUM: CPT | Performed by: PSYCHIATRY & NEUROLOGY

## 2021-10-25 PROCEDURE — 86850 RBC ANTIBODY SCREEN: CPT | Performed by: PSYCHIATRY & NEUROLOGY

## 2021-10-26 LAB — T PALLIDUM IGG SER QL IA: NONREACTIVE

## 2021-10-27 LAB
BACTERIA UR CULT: NORMAL
VZV IGG SER IA-ACNC: ABNORMAL

## 2021-10-28 ENCOUNTER — TELEPHONE (OUTPATIENT)
Dept: SCHEDULING | Age: 21
End: 2021-10-28

## 2021-11-01 ENCOUNTER — OB CHECK (OUTPATIENT)
Dept: FAMILY MEDICINE | Age: 21
End: 2021-11-01

## 2021-11-01 ENCOUNTER — OFFICE VISIT (OUTPATIENT)
Dept: MATERNAL FETAL MEDICINE | Age: 21
End: 2021-11-01

## 2021-11-01 VITALS
BODY MASS INDEX: 24.79 KG/M2 | HEART RATE: 64 BPM | WEIGHT: 149 LBS | DIASTOLIC BLOOD PRESSURE: 62 MMHG | SYSTOLIC BLOOD PRESSURE: 99 MMHG | TEMPERATURE: 97.3 F

## 2021-11-01 DIAGNOSIS — O34.219 PREVIOUS CESAREAN DELIVERY, ANTEPARTUM CONDITION OR COMPLICATION: Primary | ICD-10-CM

## 2021-11-01 DIAGNOSIS — O26.841 UTERINE SIZE DATE DISCREPANCY PREGNANCY, FIRST TRIMESTER: ICD-10-CM

## 2021-11-01 DIAGNOSIS — Z3A.08 8 WEEKS GESTATION OF PREGNANCY: ICD-10-CM

## 2021-11-01 DIAGNOSIS — Z3A.09 9 WEEKS GESTATION OF PREGNANCY: Primary | ICD-10-CM

## 2021-11-01 LAB
APPEARANCE, POC: ABNORMAL
BILIRUBIN, POC: NEGATIVE
CASE RPRT: NORMAL
COLOR, POC: YELLOW
CYTOLOGY CVX/VAG STUDY: NORMAL
GLUCOSE UR-MCNC: NEGATIVE MG/DL
HPV16+18+45 E6+E7MRNA CVX NAA+PROBE: NEGATIVE
KETONES, POC: NEGATIVE MG/DL
Lab: NORMAL
NITRITE, POC: NEGATIVE
OCCULT BLOOD, POC: ABNORMAL
PAP EDUCATIONAL NOTE: NORMAL
PH UR: 7 [PH] (ref 5–7)
PROT UR-MCNC: NEGATIVE MG/DL
SP GR UR: 1.01 (ref 1–1.03)
SPECIMEN ADEQUACY: NORMAL
UROBILINOGEN UR-MCNC: 2 MG/DL (ref 0–1)
WBC (LEUKOCYTE) ESTERASE, POC: ABNORMAL

## 2021-11-01 PROCEDURE — 87086 URINE CULTURE/COLONY COUNT: CPT | Performed by: PSYCHIATRY & NEUROLOGY

## 2021-11-01 PROCEDURE — 81003 URINALYSIS AUTO W/O SCOPE: CPT | Performed by: FAMILY MEDICINE

## 2021-11-01 PROCEDURE — 0500F INITIAL PRENATAL CARE VISIT: CPT | Performed by: FAMILY MEDICINE

## 2021-11-01 PROCEDURE — 76801 OB US < 14 WKS SINGLE FETUS: CPT | Performed by: OBSTETRICS & GYNECOLOGY

## 2021-11-01 ASSESSMENT — ENCOUNTER SYMPTOMS
LIGHT-HEADEDNESS: 0
CONSTIPATION: 0
CHILLS: 0
COLOR CHANGE: 0
SHORTNESS OF BREATH: 0
NUMBNESS: 0
CONFUSION: 0
SLEEP DISTURBANCE: 0
COUGH: 0
BRUISES/BLEEDS EASILY: 0
RHINORRHEA: 0
SEIZURES: 0
WEAKNESS: 0
NAUSEA: 0
ABDOMINAL PAIN: 0
FEVER: 0
SORE THROAT: 0
BACK PAIN: 0
VOMITING: 0
FATIGUE: 0
HEADACHES: 0
DIARRHEA: 0
DIZZINESS: 0
WHEEZING: 0

## 2021-11-03 LAB — BACTERIA UR CULT: NORMAL

## 2021-11-04 ENCOUNTER — TELEPHONE (OUTPATIENT)
Dept: OBGYN | Age: 21
End: 2021-11-04

## 2021-11-10 ENCOUNTER — TELEPHONE (OUTPATIENT)
Dept: SCHEDULING | Age: 21
End: 2021-11-10

## 2021-11-29 ENCOUNTER — OB CHECK (OUTPATIENT)
Dept: FAMILY MEDICINE | Age: 21
End: 2021-11-29

## 2021-11-29 VITALS
TEMPERATURE: 97.1 F | SYSTOLIC BLOOD PRESSURE: 107 MMHG | HEART RATE: 109 BPM | BODY MASS INDEX: 24.79 KG/M2 | DIASTOLIC BLOOD PRESSURE: 69 MMHG | WEIGHT: 149 LBS

## 2021-11-29 DIAGNOSIS — N93.9 VAGINAL BLEEDING: ICD-10-CM

## 2021-11-29 DIAGNOSIS — Z3A.14 14 WEEKS GESTATION OF PREGNANCY: Primary | ICD-10-CM

## 2021-11-29 DIAGNOSIS — Z3A.12 12 WEEKS GESTATION OF PREGNANCY: ICD-10-CM

## 2021-11-29 LAB
APPEARANCE, POC: CLEAR
BILIRUBIN, POC: NEGATIVE
COLOR, POC: YELLOW
GLUCOSE UR-MCNC: NEGATIVE MG/DL
KETONES, POC: NEGATIVE MG/DL
NITRITE, POC: NEGATIVE
OCCULT BLOOD, POC: ABNORMAL
PH UR: 7 [PH] (ref 5–7)
PROT UR-MCNC: NEGATIVE MG/DL
SP GR UR: 1.02 (ref 1–1.03)
UROBILINOGEN UR-MCNC: ABNORMAL MG/DL (ref 0–1)
WBC (LEUKOCYTE) ESTERASE, POC: ABNORMAL

## 2021-11-29 PROCEDURE — 0502F SUBSEQUENT PRENATAL CARE: CPT | Performed by: FAMILY MEDICINE

## 2021-11-29 PROCEDURE — 87086 URINE CULTURE/COLONY COUNT: CPT | Performed by: PSYCHIATRY & NEUROLOGY

## 2021-11-29 PROCEDURE — 81003 URINALYSIS AUTO W/O SCOPE: CPT | Performed by: FAMILY MEDICINE

## 2021-12-01 LAB — BACTERIA UR CULT: NORMAL

## 2021-12-30 ENCOUNTER — OB CHECK (OUTPATIENT)
Dept: FAMILY MEDICINE | Age: 21
End: 2021-12-30

## 2021-12-30 VITALS
HEIGHT: 65 IN | SYSTOLIC BLOOD PRESSURE: 120 MMHG | TEMPERATURE: 97.4 F | WEIGHT: 153.8 LBS | BODY MASS INDEX: 25.62 KG/M2 | DIASTOLIC BLOOD PRESSURE: 68 MMHG | HEART RATE: 86 BPM

## 2021-12-30 DIAGNOSIS — N93.9 VAGINAL BLEEDING: ICD-10-CM

## 2021-12-30 DIAGNOSIS — Z3A.16 16 WEEKS GESTATION OF PREGNANCY: Primary | ICD-10-CM

## 2021-12-30 LAB
APPEARANCE, POC: CLEAR
BILIRUBIN, POC: NEGATIVE
COLOR, POC: YELLOW
GLUCOSE UR-MCNC: NEGATIVE MG/DL
KETONES, POC: NEGATIVE MG/DL
NITRITE, POC: NEGATIVE
OCCULT BLOOD, POC: NORMAL
PH UR: 6 [PH] (ref 5–7)
PROT UR-MCNC: NEGATIVE MG/DL
SP GR UR: 1.01 (ref 1–1.03)
UROBILINOGEN UR-MCNC: NORMAL MG/DL (ref 0–1)
WBC (LEUKOCYTE) ESTERASE, POC: NORMAL

## 2021-12-30 PROCEDURE — 0502F SUBSEQUENT PRENATAL CARE: CPT | Performed by: FAMILY MEDICINE

## 2021-12-30 PROCEDURE — 81003 URINALYSIS AUTO W/O SCOPE: CPT | Performed by: FAMILY MEDICINE

## 2021-12-30 PROCEDURE — 87086 URINE CULTURE/COLONY COUNT: CPT | Performed by: PSYCHIATRY & NEUROLOGY

## 2021-12-30 ASSESSMENT — PAIN SCALES - GENERAL: PAINLEVEL: 0

## 2022-01-01 LAB — BACTERIA UR CULT: NORMAL

## 2022-01-21 ENCOUNTER — APPOINTMENT (OUTPATIENT)
Dept: OBGYN | Age: 22
End: 2022-01-21

## 2022-01-21 ENCOUNTER — TELEPHONE (OUTPATIENT)
Dept: MATERNAL FETAL MEDICINE | Age: 22
End: 2022-01-21

## 2022-01-24 ENCOUNTER — OFFICE VISIT (OUTPATIENT)
Dept: MATERNAL FETAL MEDICINE | Age: 22
End: 2022-01-24

## 2022-01-24 ENCOUNTER — APPOINTMENT (OUTPATIENT)
Dept: MATERNAL FETAL MEDICINE | Age: 22
End: 2022-01-24

## 2022-01-24 DIAGNOSIS — Z13.89 ENCOUNTER FOR ROUTINE SCREENING FOR MALFORMATION USING ULTRASONICS: Primary | ICD-10-CM

## 2022-01-24 DIAGNOSIS — Z3A.20 20 WEEKS GESTATION OF PREGNANCY: ICD-10-CM

## 2022-01-24 PROCEDURE — 76805 OB US >/= 14 WKS SNGL FETUS: CPT | Performed by: OBSTETRICS & GYNECOLOGY

## 2022-01-25 ENCOUNTER — E-ADVICE (OUTPATIENT)
Dept: FAMILY MEDICINE | Age: 22
End: 2022-01-25

## 2022-02-08 ENCOUNTER — APPOINTMENT (OUTPATIENT)
Dept: FAMILY MEDICINE | Age: 22
End: 2022-02-08

## 2022-02-08 ENCOUNTER — OB CHECK (OUTPATIENT)
Dept: FAMILY MEDICINE | Age: 22
End: 2022-02-08

## 2022-02-08 VITALS
BODY MASS INDEX: 26.33 KG/M2 | SYSTOLIC BLOOD PRESSURE: 101 MMHG | TEMPERATURE: 97 F | DIASTOLIC BLOOD PRESSURE: 67 MMHG | HEART RATE: 102 BPM | WEIGHT: 158.2 LBS

## 2022-02-08 DIAGNOSIS — R31.1 BENIGN ESSENTIAL MICROSCOPIC HEMATURIA: ICD-10-CM

## 2022-02-08 DIAGNOSIS — Z13.1 SCREENING FOR DIABETES MELLITUS (DM): Primary | ICD-10-CM

## 2022-02-08 DIAGNOSIS — N93.9 VAGINAL BLEEDING: ICD-10-CM

## 2022-02-08 DIAGNOSIS — Z3A.22 22 WEEKS GESTATION OF PREGNANCY: ICD-10-CM

## 2022-02-08 DIAGNOSIS — Z13.0 SCREENING FOR DEFICIENCY ANEMIA: ICD-10-CM

## 2022-02-08 LAB
BILIRUBIN, POC: NEGATIVE
COLOR, POC: YELLOW
GLUCOSE UR-MCNC: NEGATIVE MG/DL
KETONES, POC: NEGATIVE MG/DL
NITRITE, POC: NEGATIVE
OCCULT BLOOD, POC: NORMAL
PH UR: 6 [PH] (ref 5–7)
PROT UR-MCNC: NEGATIVE MG/DL
SP GR UR: 1.01 (ref 1–1.03)
UROBILINOGEN UR-MCNC: NORMAL MG/DL (ref 0–1)
WBC (LEUKOCYTE) ESTERASE, POC: NORMAL

## 2022-02-08 PROCEDURE — 87086 URINE CULTURE/COLONY COUNT: CPT | Performed by: PSYCHIATRY & NEUROLOGY

## 2022-02-08 PROCEDURE — 59425 ANTEPARTUM CARE ONLY: CPT | Performed by: FAMILY MEDICINE

## 2022-02-08 PROCEDURE — 81002 URINALYSIS NONAUTO W/O SCOPE: CPT | Performed by: FAMILY MEDICINE

## 2022-02-10 LAB — BACTERIA UR CULT: NORMAL

## 2022-02-15 ENCOUNTER — E-ADVICE (OUTPATIENT)
Dept: FAMILY MEDICINE | Age: 22
End: 2022-02-15

## 2022-02-18 ENCOUNTER — TELEPHONE (OUTPATIENT)
Dept: SCHEDULING | Age: 22
End: 2022-02-18

## 2022-02-22 ENCOUNTER — TELEPHONE (OUTPATIENT)
Dept: SCHEDULING | Age: 22
End: 2022-02-22

## 2022-02-24 ENCOUNTER — TELEPHONE (OUTPATIENT)
Dept: SCHEDULING | Age: 22
End: 2022-02-24

## 2022-02-25 ENCOUNTER — TELEPHONE (OUTPATIENT)
Dept: SCHEDULING | Age: 22
End: 2022-02-25

## 2022-03-02 ENCOUNTER — APPOINTMENT (OUTPATIENT)
Dept: OBGYN | Age: 22
End: 2022-03-02

## 2022-03-07 ENCOUNTER — APPOINTMENT (OUTPATIENT)
Dept: FAMILY MEDICINE | Age: 22
End: 2022-03-07

## 2022-03-16 ENCOUNTER — TELEPHONE (OUTPATIENT)
Dept: FAMILY MEDICINE | Age: 22
End: 2022-03-16

## 2022-03-24 ENCOUNTER — E-ADVICE (OUTPATIENT)
Dept: FAMILY MEDICINE | Age: 22
End: 2022-03-24

## 2022-04-11 ENCOUNTER — TELEPHONE (OUTPATIENT)
Dept: FAMILY MEDICINE | Age: 22
End: 2022-04-11

## 2023-07-17 ENCOUNTER — TELEPHONE (OUTPATIENT)
Dept: OBGYN CLINIC | Facility: CLINIC | Age: 23
End: 2023-07-17

## 2023-07-17 NOTE — TELEPHONE ENCOUNTER
Patient calling to initiate prenatal care  LMP 4/13/23  Patient is 7-8 weeks on 6/8/23  Confirmation Ultrasound and Appointment scheduled on   Future Appointments   Date Time Provider Stephan Segura   7/24/2023  2:30 PM [facility] US [facility] [place]   7/24/2023  3:00 PM Cecelia Bergeron MD [facility] [place]   8/14/2023  1:00 PM Anam Villa MD [facility] [place]         Any history of ectopic pregnancy? no  Any history of miscarriage? no  Any medications that you are taking on a regular basis other than prenatal vitamins? No (if not taking prenatal vitamins, encourage patient to start taking.)  Any bleeding since the first day of last LMP and your positive pregnancy test? no    Insurance [employer] N    Pt has not had an prenatal care at this point, scheduled for first available  ultrasound/Dr appt.

## 2023-07-24 ENCOUNTER — ULTRASOUND ENCOUNTER (OUTPATIENT)
Dept: OBGYN CLINIC | Facility: CLINIC | Age: 23
End: 2023-07-24
Payer: COMMERCIAL

## 2023-07-24 ENCOUNTER — OFFICE VISIT (OUTPATIENT)
Dept: OBGYN CLINIC | Facility: CLINIC | Age: 23
End: 2023-07-24
Payer: COMMERCIAL

## 2023-07-24 VITALS — WEIGHT: 131.13 LBS | DIASTOLIC BLOOD PRESSURE: 70 MMHG | HEART RATE: 75 BPM | SYSTOLIC BLOOD PRESSURE: 114 MMHG

## 2023-07-24 DIAGNOSIS — Z32.01 PREGNANCY EXAMINATION OR TEST, POSITIVE RESULT: Primary | ICD-10-CM

## 2023-07-24 DIAGNOSIS — Z34.80 SUPERVISION OF OTHER NORMAL PREGNANCY, ANTEPARTUM: ICD-10-CM

## 2023-07-24 DIAGNOSIS — N91.1 SECONDARY AMENORRHEA: ICD-10-CM

## 2023-07-24 PROBLEM — Z87.59 HISTORY OF CHOLESTASIS DURING PREGNANCY: Status: ACTIVE | Noted: 2023-07-24

## 2023-07-24 PROBLEM — Z98.891 HISTORY OF CESAREAN DELIVERY: Status: ACTIVE | Noted: 2023-07-24

## 2023-07-24 PROBLEM — Z87.19 HISTORY OF CHOLESTASIS DURING PREGNANCY: Status: ACTIVE | Noted: 2023-07-24

## 2023-07-24 PROBLEM — O34.219 HX SUCCESSFUL VBAC (VAGINAL BIRTH AFTER CESAREAN), CURRENTLY PREGNANT (HCC): Status: ACTIVE | Noted: 2023-07-24

## 2023-07-24 PROBLEM — O34.219 HX SUCCESSFUL VBAC (VAGINAL BIRTH AFTER CESAREAN), CURRENTLY PREGNANT: Status: ACTIVE | Noted: 2023-07-24

## 2023-07-24 NOTE — PROGRESS NOTES
[facility]  Obstetrics and Gynecology    Subjective:     Sunil Acuna is a 23year old  female presents with c/o secondary amenorrhea and positive pregnancy test. The patient was recommended to return for further evaluation. The patient reports doing well. Patient's last menstrual period was 2023. Daxa Diop Review of Systems:  General: no complaints per category. See HPI for additional information. Breast: no complaints per category. See HPI for additional information. Respiratory: no complaints per category. See HPI for additional information. Cardiovascular: no complaints per category. See HPI for additional information. GI: no complaints per category. See HPI for additional information. : no complaints per category. See HPI for additional information. Heme: no complaints per category. See HPI for additional information. OB History:   OB History    Para Term  AB Living   3 2 2     2   SAB IAB Ectopic Multiple Live Births           2      # Outcome Date GA Lbr Barney/2nd Weight Sex Delivery Anes PTL Lv   3 Current            2 Term 22 37w0d   F    ZAINA   1 Term 10/30/18 40w0d   F Caesarean   ZAINA      Obstetric Comments   Cholestasis of pregnancy in 2022   PLTCS 10/2018 2/2 arrest of descent (pushed for 4 hours)        Gyne History:  Menarche: 11  Period Cycle (Days): pregnant  Period Duration (Days): na  Period Flow: na  Use of Birth Control (if yes, specify type): None  Hx Prior Abnormal Pap: No  Pap Result Notes: never had pap  Patient's last menstrual period was 2023. Meds:  Prenatal Multivit-Min-Fe-FA (PRENATAL VITAMINS OR), Take by mouth., Disp: , Rfl:     No current facility-administered medications on file prior to visit.       All:  No Known Allergies    PMH:  Past Medical History:   Diagnosis Date    History of cholestasis during pregnancy 2022    History of         PSH:  Past Surgical History:   Procedure Laterality Date History of Present Illness





- Reason for Consult


Consult date: 09/25/19


Requesting physician: Poli Pate





- Chief Complaint


Bilateral lower extremity pain





- History of Present Illness





This is a 75-year-old patient referred by Dr. Pate for chronic pain in lower

back radiating to bilateral lower extremities.  She states that her pain began 3

months ago, after a fall.  She reports pain from her tailbone to bilateral lower

extremities, in a non-radicular fashion.  She does endorse bilateral stocking-

like lower extremity numbness.  She endorses subjective weakness in bilateral 

lower extremities as well.  Patient has been taking medications from primary 

care physician including Lyrica and was recently switched to gabapentin.  She 

was admitted to the hospital for memory loss/altered mental status.  Of note she

is on Plavix for history of stroke, last dose was yesterday, 09/24/2019.  Pain 

management was consulted to see if it was possible to do a lumbar epidural 

steroid injection. 





In addition to above, 13-point review of systems is also negative for chest 

pain, shortness of breath, changes in vision, changes in hearing, new onset 

weakness, abdominal pain, diarrhea, extreme fatigue, malaise, fever, skin ch

anges, homicidal or suicidal ideation, or bowel or bladder incontinence.








Past Medical History


Past Medical History: Diabetes Mellitus, Hyperlipidemia, Hypertension, Memory 

Impairment


History of Any Multi-Drug Resistant Organisms: None Reported


Past Surgical History: Hysterectomy, Tonsillectomy


Additional Past Surgical History / Comment(s): right leg vein stripping


Past Anesthesia/Blood Transfusion Reactions: No Reported Reaction


Past Psychological History: No Psychological Hx Reported


Smoking Status: Former smoker


Past Alcohol Use History: None Reported


Past Drug Use History: None Reported





- Past Family History


  ** Father


Family Medical History: No Reported History





  ** Mother


Family Medical History: No Reported History





Medications and Allergies


                                Home Medications











 Medication  Instructions  Recorded  Confirmed  Type


 


Aspirin 81 mg PO DAILY 09/22/14 09/24/19 History


 


Furosemide [Lasix] 40 mg PO DAILY 09/22/14 09/24/19 History


 


Simvastatin [Zocor] 80 mg PO HS 09/22/14 09/24/19 History


 


Magnesium 200 mg PO BID 12/30/18 09/24/19 History


 


Multivitamins, Thera [Multivitamin 1 tab PO DAILY 12/30/18 09/24/19 History





(formulary)]    


 


Repaglinide 0.5 mg PO BID-W/MEALS 12/30/18 09/24/19 History


 


predniSONE 10 mg PO DAILY 12/30/18 09/24/19 History


 


Carvedilol [Coreg] 12.5 mg PO BID 06/05/19 09/24/19 History


 


Clopidogrel [Plavix] 75 mg PO DAILY 06/05/19 09/24/19 History


 


Cholecalciferol [Vitamin D3] 400 unit PO DAILY 09/24/19 09/24/19 History


 


Furosemide [Lasix] 20 mg PO HS 09/24/19 09/24/19 History








                                    Allergies











Allergy/AdvReac Type Severity Reaction Status Date / Time


 


No Known Allergies Allergy   Verified 09/24/19 19:35














Physical Exam


Vitals: 


                                   Vital Signs











  Temp Pulse Resp BP Pulse Ox


 


 09/25/19 07:00  97.7 F  52 L  16  131/65  91 L


 


 09/25/19 01:42  98.0 F  50 L  18  118/63  94 L


 


 09/24/19 21:20  97.8 F  54 L  14  104/54  92 L








                                Intake and Output











 09/24/19 09/25/19 09/25/19





 22:59 06:59 14:59


 


Output Total 400  


 


Balance -400  


 


Output:   


 


  Urine 400  


 


Other:   


 


  Voiding Method Toilet  Toilet


 


  Weight 82.1 kg  

















Physical exam:


Vital Signs:  Reviewed in EMR


GENERAL: Well appearing, in no acute distress


PSYCH: Mood and affect is appropriate. Awake, alert, and oriented


SKIN: Skin color, texture, turgor normal, no rashes or lesions


HEENT: Normocephalic, atraumatic. EOM intact


CV: 1+ bilateral pedal edema


RESP: Respirations are unlabored, no audible wheezing


GI: Abdomen non-distended


MUSCULOSKELETAL: Bilateral lower extremity strength is grossly 4 out of 5 

symmetric. No atrophy or tone abnormalities are noted.


Lumbar spine: Straight leg raising in the sitting position is negative for 

radicular pain.  Tenderness to palpation over the lumbar spine and paraspinous 

muscles bilaterally.


Extremities: Peripheral joint ROM is full and pain free without obvious 

instability or laxity in all four extremities. No edema or skin discolorations n

oted.


NEUR: Bilateral lower extremity coordination and muscle stretch


reflexes are physiologic and symmetric.  Negative clonus.  Reduced sensation in 

bilateral lower extremities noted in a stocking distribution.  Cranial nerves 

are grossly intact.











Results


Results: 








MRI lumbar spine done at [facility] on 09/25/2019 shows spinal stenosis 

greatest at L4-5 with multilevel degenerative disc disease, foraminal encroach

ment.





CBC & Chem 7: 


                                 09/24/19 17:57





                                 09/24/19 17:57


Labs: 


                  Abnormal Lab Results - Last 24 Hours (Table)











  09/24/19 09/24/19 09/24/19 Range/Units





  17:09 17:57 19:40 


 


Potassium   3.4 L   (3.5-5.1)  mmol/L


 


Carbon Dioxide   34 H   (22-30)  mmol/L


 


BUN   36 H   (7-17)  mg/dL


 


Creatinine   1.45 H   (0.52-1.04)  mg/dL


 


Glucose   168 H   (74-99)  mg/dL


 


POC Glucose (mg/dL)  184 H    (75-99)  mg/dL


 


Ur Leukocyte Esterase    Trace H  (Negative)  


 


Urine WBC    6 H  (0-5)  /hpf


 


Amorphous Sediment    Rare H  (None)  /hpf


 


Urine Bacteria    Occasional H  (None)  /hpf














  09/24/19 09/25/19 Range/Units





  22:53 06:53 


 


Potassium    (3.5-5.1)  mmol/L


 


Carbon Dioxide    (22-30)  mmol/L


 


BUN    (7-17)  mg/dL


 


Creatinine    (0.52-1.04)  mg/dL


 


Glucose    (74-99)  mg/dL


 


POC Glucose (mg/dL)  201 H  101 H  (75-99)  mg/dL


 


Ur Leukocyte Esterase    (Negative)  


 


Urine WBC    (0-5)  /hpf


 


Amorphous Sediment    (None)  /hpf


 


Urine Bacteria    (None)  /hpf








                      Microbiology - Last 24 Hours (Table)











 09/24/19 19:40 Urine Culture - Preliminary





 Urine,Voided 














Assessment and Plan


Plan: 





Assessment:


1.  Lumbar radiculopathy


2.  Lumbar spondylosis


3.  Lumbar degenerative disc disease





Plan:





1. Procedures:  Unfortunately, patient is not a candidate for lumbar epidural 

steroid injection at this time as she is on Plavix, last dose yesterday.  In the

 future, if patient can be off Plavix for 5-7 days, she would likely benefit 

from a lumbar epidural steroid injection.  Encouraged patient to discuss 

referral to [facility] pain clinic with the primary care physician.  We 

can see her as outpatient.





2. Investigations:  MRI lumbar spine reviewed





3. Medications:  Per primary team





8. Disposition: At pain clinic following discharge to discuss possible 

procedures 





PQRS Measure Charge Sheet


PQRS Narrative: 


                                        





Smoking Status                   Former smoker


Do You Want the Pneumonia        Vaccine Up to Date


Vaccine AT THIS TIME?            


Blood Pressure [Left Arm]        131/65


Pain Intensity [Bilateral Leg]   5


Scale Used                       Numeric (1 - 10)








Home Medications: 


Ambulatory Orders





Aspirin 81 mg PO DAILY 09/22/14 


Furosemide [Lasix] 40 mg PO DAILY 09/22/14 


Simvastatin [Zocor] 80 mg PO HS 09/22/14 


Magnesium 200 mg PO BID 12/30/18 


Multivitamins, Thera [Multivitamin (formulary)] 1 tab PO DAILY 12/30/18 


Repaglinide 0.5 mg PO BID-W/MEALS 12/30/18 


predniSONE 10 mg PO DAILY 12/30/18 


Carvedilol [Coreg] 12.5 mg PO BID 06/05/19 


Clopidogrel [Plavix] 75 mg PO DAILY 06/05/19 


Cholecalciferol [Vitamin D3] 400 unit PO DAILY 09/24/19 


Furosemide [Lasix] 20 mg PO HS 09/24/19  DELIVERY+POSTPARTUM CARE  10/2018    CHOLECYSTECTOMY       CLASS  10/2018       Objective:      23  1513   BP: 114/70   Pulse: 75   Weight: 131 lb 2 oz (59.5 kg)         There is no height or weight on file to calculate BMI. General: AAO.NAD.   CVS exam: normal peripheral perfusion  Chest: non-labored breathing, no tachypnea   Abdominal exam: deferred   Pelvic exam: deferred        Imaging:  First Trimester US   GA by LMP: 14w4d   GA by US: 13w5d   FHT: 141 bpm   Impression: Single live IUP. Early viable. Gestational sac, yolk sac and fetal pole seen. Both ovaries wnl. No free fluid. Cardiac activity noted. Cervix appears closed and wnl.    DOC 24 by LMP     Reviewed and electronically signed by: Tamera Mast MD, 23, 3:27 PM        Assessment:     Alvaro Mark is a 23year old  female who presents for secondary amenorrhea and positive pregnancy test    Patient Active Problem List:     History of  delivery     Hx successful  (vaginal birth after ), currently pregnant     History of cholestasis during pregnancy        Plan:     Secondary amenorrhea  - a/w positive pregnancy test  - US noted for single viable IUP at 14w4d   - Pt counseled on safety, diet, exercise, caffiene, tobacco, ETOH, sexual activity, ER precautions, diet, exercise, appropriate otc medication use, substance abuse   - Counseled on taking a PNV with 3.3MN of folic acid   - genetic screening testing d/w patient and family, pt declines invasive diagnostic testing and considering first versus second trimester screening   - advised to follow up to establish prenatal care   - SAB precautions provided   - d/w nausea and vomiting in pregnancy including vitamin B 6 and unisom   - COVID 19 precautions provided, recommend vaccination and booster if/when applicable   - pnl orders placed   Hx of PLTCS  - PCS / arrest of descent in 10/2018  - Hx of  2022  - desires   Hx of cholestasis   - hx of cholecystectomy following delivery   - continue to monitor     All of the findings and plan were discussed with the patient. She notes understanding and agrees with the plan of care. All questions were answered to the best of my ability at this time. Total patient time was 30 minutes in evaluation, consultation, and coordination of care. This included face to face and non-face to face actions. The patient's questions and concerns that were addressed. RTC in 4 weeks or sooner if needed     Andres Pastor MD   [facility] - OBGYN     Discussed with patient that there will not be further notification of normal or benign results other than receiving results on [facility]. A [facility] message or telephone call will be placed by the physician and/or office staff if results are abnormal.         Note to patient and family   The [facility] makes medical notes available to patients in the interest of transparency. However, please be advised that this is a medical document. It is intended as ebwk-gq-uppq communication. It is written and medical language may contain abbreviations or verbiage that are technical and unfamiliar. It may appear blunt or direct. Medical documents are intended to carry relevant information, facts as evident, and the clinical opinion of the practitioner. This note could include assistance by [employer] recognition.  Errors in content may be related to improper recognition by the system; efforts to review and correct have been done but errors may still exist.

## 2023-08-04 ENCOUNTER — TELEPHONE (OUTPATIENT)
Dept: OBGYN CLINIC | Facility: CLINIC | Age: 23
End: 2023-08-04

## 2023-08-04 ENCOUNTER — PATIENT MESSAGE (OUTPATIENT)
Dept: OBGYN CLINIC | Facility: CLINIC | Age: 23
End: 2023-08-04

## 2023-08-04 DIAGNOSIS — Z34.80 SUPERVISION OF OTHER NORMAL PREGNANCY, ANTEPARTUM: Primary | ICD-10-CM

## 2023-08-04 NOTE — TELEPHONE ENCOUNTER
[facility] prenatal lab orders placed    Notified pt's spouse.      Faxed via [employer] to [address] [place] [address] at [phone]

## 2023-08-04 NOTE — TELEPHONE ENCOUNTER
From: Elis Patterson  To: Michael Erwin MD  Sent: 8/4/2023 10:26 AM CDT  Subject: Blood Work Order To [employer] Team,    Can you please send/FAX my blood work/order to the below ASAP:    [facility]. [address]    The labs under  [facility] are not covered by our insurance. So i need to get these done from different labs which is in-network to our insurance plan.     Thanks,  Tequila Ryder ([phone])

## 2023-08-04 NOTE — TELEPHONE ENCOUNTER
Patient spouse called to [facility].  Changed to [facility]. Please reorder all labs under [facility].

## 2023-08-09 LAB
ABSOLUTE BASOPHILS: 26 CELLS/UL (ref 0–200)
ABSOLUTE EOSINOPHILS: 113 CELLS/UL (ref 15–500)
ABSOLUTE LYMPHOCYTES: 2149 CELLS/UL (ref 850–3900)
ABSOLUTE MONOCYTES: 461 CELLS/UL (ref 200–950)
ABSOLUTE NEUTROPHILS: 5951 CELLS/UL (ref 1500–7800)
BASOPHILS: 0.3 %
EOSINOPHILS: 1.3 %
HEMATOCRIT: 32.9 % (ref 35–45)
HEMOGLOBIN: 10.7 G/DL (ref 11.7–15.5)
LYMPHOCYTES: 24.7 %
MCH: 27.8 PG (ref 27–33)
MCHC: 32.5 G/DL (ref 32–36)
MCV: 85.5 FL (ref 80–100)
MONOCYTES: 5.3 %
MPV: 12.7 FL (ref 7.5–12.5)
NEUTROPHILS: 68.4 %
PLATELET COUNT: 218 THOUSAND/UL (ref 140–400)
RDW: 14.5 % (ref 11–15)
RED BLOOD CELL COUNT: 3.85 MILLION/UL (ref 3.8–5.1)
RUBELLA ANTIBODY (IGG): 13.8 INDEX
WHITE BLOOD CELL COUNT: 8.7 THOUSAND/UL (ref 3.8–10.8)

## 2023-08-14 ENCOUNTER — INITIAL PRENATAL (OUTPATIENT)
Dept: OBGYN CLINIC | Facility: CLINIC | Age: 23
End: 2023-08-14
Payer: COMMERCIAL

## 2023-08-14 VITALS — DIASTOLIC BLOOD PRESSURE: 68 MMHG | WEIGHT: 129 LBS | SYSTOLIC BLOOD PRESSURE: 110 MMHG | HEART RATE: 78 BPM

## 2023-08-14 DIAGNOSIS — Z36.9 ANTENATAL SCREENING ENCOUNTER: ICD-10-CM

## 2023-08-14 DIAGNOSIS — Z12.4 SCREENING FOR CERVICAL CANCER: ICD-10-CM

## 2023-08-14 DIAGNOSIS — Z11.3 SCREEN FOR STD (SEXUALLY TRANSMITTED DISEASE): ICD-10-CM

## 2023-08-14 DIAGNOSIS — Z34.90 PREGNANCY, UNSPECIFIED GESTATIONAL AGE: Primary | ICD-10-CM

## 2023-08-14 DIAGNOSIS — Z34.90 PRENATAL CARE, ANTEPARTUM: ICD-10-CM

## 2023-08-14 LAB
GLUCOSE (URINE DIPSTICK): NEGATIVE MG/DL
MULTISTIX LOT#: NORMAL NUMERIC
PROTEIN (URINE DIPSTICK): NEGATIVE MG/DL

## 2023-08-14 PROCEDURE — 3078F DIAST BP <80 MM HG: CPT | Performed by: OBSTETRICS & GYNECOLOGY

## 2023-08-14 PROCEDURE — 87491 CHLMYD TRACH DNA AMP PROBE: CPT | Performed by: OBSTETRICS & GYNECOLOGY

## 2023-08-14 PROCEDURE — 81002 URINALYSIS NONAUTO W/O SCOPE: CPT | Performed by: OBSTETRICS & GYNECOLOGY

## 2023-08-14 PROCEDURE — 87591 N.GONORRHOEAE DNA AMP PROB: CPT | Performed by: OBSTETRICS & GYNECOLOGY

## 2023-08-14 PROCEDURE — 3074F SYST BP LT 130 MM HG: CPT | Performed by: OBSTETRICS & GYNECOLOGY

## 2023-08-14 NOTE — PROGRESS NOTES
Subjective:  Patient presents with:  Prenatal Care: New Ob    20year old  female    presents for new OB visit    Patient's last menstrual period was 2023. Estimated Date of Delivery: 24   Patient without complaints since finding out she is pregnant. She is taking an OTC PNV with DHA. She has no concerns regarding this pregnancy. Previous pregnancies uncomplicated  section for arrest of descent, then . No hypertension,  labor or diabetes. Hx of cholestasis. OB History    Para Term  AB Living   3 2 2     2   SAB IAB Ectopic Multiple Live Births           2      # Outcome Date GA Lbr Barney/2nd Weight Sex Delivery Anes PTL Lv   3 Current            2 Term 22 37w0d   F    ZAINA   1 Term 10/30/18 40w0d   F Caesarean   ZAINA      Obstetric Comments   Cholestasis of pregnancy in 2022   PLTCS 10/2018 2/2 arrest of descent (pushed for 4 hours)        Past Medical History:   Diagnosis Date    History of cholestasis during pregnancy 2022    History of       Past Surgical History:   Procedure Laterality Date     DELIVERY+POSTPARTUM CARE  10/2018    CHOLECYSTECTOMY       CLASS  10/2018       Medications:  Prenatal Multivit-Min-Fe-FA (PRENATAL VITAMINS OR), Take by mouth., Disp: , Rfl:     No current facility-administered medications on file prior to visit. Allergies:  No Known Allergies    Social History:  Social History    Tobacco Use      Smoking status: Never      Smokeless tobacco: Never    Alcohol use: Not Currently      Family History:  History reviewed. No pertinent family history. Review of Systems:  Pertinent items are noted in HPI.     Objective:  /68   Pulse 78   Wt 129 lb (58.5 kg)   LMP 2023    Physical Examination:  General appearance: Well dressed, well nourished in no apparent distress  Neurologic/Psychiatric: Alert and oriented to person, place and time, mood normal, affect appropriate  Head: Normocephalic without obvious deformity, atraumatic  Neck: No thyromegaly, supple, non-tender, no masses, no adenopathy  Lungs: Clear to auscultation bilaterally, no rales, wheezes or rhonchi  Breasts: Declined  Heart[de-identified] Regular rate and rhythm, no gallops or murmurs  Abdomen: Soft, non-tender, non-distended, no masses, no hepatosplenomegaly, no hernias, no inguinal lymphadenopathy  Pelvic:    External genitalia- Normal, Bartholin's, urethra, skeins glands normal   Vagina- No vaginal lesions, no discharge   Cervix- No lesions, long/closed, no cervical motion tenderness   Uterus- 17 week sized, non-tender, no masses   Adnexa-  Non-tender, no masses   Pelvimetry- Normal pelvimetry, normal arch  Extremities: Non-tender, full range of motion, no clubbing, cyanosis or edema  Skin:  General inspection- no rashes, lesions or discoloration      Assessment:  20year old  EGA 17w4d DOC 24  Amenorrhea, positive pregnancy test.  Addressed patients concerns regarding pregnancy. Current problems for this pregnancy:   Hx of previous CS and previous successful - Risks, benefits and potential complications of TOLAC reviewed with patient including hemorrhage with need for blood transfusion, hysterectomy, lack of oxygen to fetus and inability of emergent  section to prevent such complications. All questions answered and patient agrees to proceed with trial of labor. Consent form reviewed and signed. Hx of cholestasis- increased recurrence risk    Plan:  Pap smear/STD screen obtained. Order for prenatal labs given. Patient consents to HIV testing, counseled and cleared for release of HIV test results to patient. Discussed optional prenatal screening tests including cfDNA, CF/carrier screen, NT/first trimester screen, Quad Screen/AFP, Level II ultrasound and amniocentesis/CVS.  Declines all of the above. Prenatal vitamins with DHA. New OB education completed.   Vaccine recommendations reviewed including Pertussis 26-37 weeks, flu vaccine any trimester, Covid vaccine and booster. Discussed diet, exercise, travel, food and medication safety. Information sheets on prenatal screening tests, vaccine recommendations, nausea in pregnancy and prenatal care given. Diagnoses and all orders for this visit:    Pregnancy, unspecified gestational age  -     URINALYSIS NONAUTO W/O SCOPE (OB URISTIX)    Prenatal care, antepartum     screening encounter    Screening for cervical cancer  -     THINPREP PAP WITH HPV REFLEX REQUEST B; Future    Screen for STD (sexually transmitted disease)  -     CHLAMYDIA/GONOCOCCUS, FAWAD; Future      Return in about 4 weeks (around 2023) for Routine Prenatal Visit, Labs Next Available.

## 2023-08-15 LAB
C TRACH DNA SPEC QL NAA+PROBE: NEGATIVE
N GONORRHOEA DNA SPEC QL NAA+PROBE: NEGATIVE

## 2023-09-12 ENCOUNTER — ULTRASOUND ENCOUNTER (OUTPATIENT)
Dept: OBGYN CLINIC | Facility: CLINIC | Age: 23
End: 2023-09-12
Payer: COMMERCIAL

## 2023-09-12 ENCOUNTER — ROUTINE PRENATAL (OUTPATIENT)
Dept: OBGYN CLINIC | Facility: CLINIC | Age: 23
End: 2023-09-12
Payer: COMMERCIAL

## 2023-09-12 VITALS
WEIGHT: 135 LBS | BODY MASS INDEX: 22.49 KG/M2 | SYSTOLIC BLOOD PRESSURE: 102 MMHG | HEART RATE: 75 BPM | HEIGHT: 65 IN | DIASTOLIC BLOOD PRESSURE: 56 MMHG

## 2023-09-12 DIAGNOSIS — Z34.82 ENCOUNTER FOR SUPERVISION OF OTHER NORMAL PREGNANCY IN SECOND TRIMESTER: Primary | ICD-10-CM

## 2023-09-12 PROBLEM — Z34.90 SUPERVISION OF NORMAL PREGNANCY (HCC): Status: ACTIVE | Noted: 2023-09-12

## 2023-09-12 PROBLEM — Z34.90 SUPERVISION OF NORMAL PREGNANCY: Status: ACTIVE | Noted: 2023-09-12

## 2023-09-12 PROCEDURE — 76805 OB US >/= 14 WKS SNGL FETUS: CPT | Performed by: OBSTETRICS & GYNECOLOGY

## 2023-09-12 PROCEDURE — 3074F SYST BP LT 130 MM HG: CPT | Performed by: OBSTETRICS & GYNECOLOGY

## 2023-09-12 PROCEDURE — 3008F BODY MASS INDEX DOCD: CPT | Performed by: OBSTETRICS & GYNECOLOGY

## 2023-09-12 PROCEDURE — 3078F DIAST BP <80 MM HG: CPT | Performed by: OBSTETRICS & GYNECOLOGY

## 2023-10-10 ENCOUNTER — ROUTINE PRENATAL (OUTPATIENT)
Dept: OBGYN CLINIC | Facility: CLINIC | Age: 23
End: 2023-10-10
Payer: COMMERCIAL

## 2023-10-10 VITALS
BODY MASS INDEX: 24 KG/M2 | DIASTOLIC BLOOD PRESSURE: 64 MMHG | SYSTOLIC BLOOD PRESSURE: 108 MMHG | HEART RATE: 75 BPM | WEIGHT: 141.63 LBS

## 2023-10-10 DIAGNOSIS — Z34.90 ENCOUNTER FOR SUPERVISION OF NORMAL PREGNANCY, ANTEPARTUM, UNSPECIFIED GRAVIDITY: Primary | ICD-10-CM

## 2023-10-10 PROCEDURE — 3078F DIAST BP <80 MM HG: CPT | Performed by: NURSE PRACTITIONER

## 2023-10-10 PROCEDURE — 3074F SYST BP LT 130 MM HG: CPT | Performed by: NURSE PRACTITIONER

## 2023-10-10 NOTE — PROGRESS NOTES
ANNA  Doing well, some leg cramps. Admits to poor hydration.   FM is good  RH positive  1 hr glucose/CBC ordered with [facility] per patient preference  TDAP recommended   RTC 4wks

## 2023-10-11 ENCOUNTER — PATIENT MESSAGE (OUTPATIENT)
Dept: OBGYN CLINIC | Facility: CLINIC | Age: 23
End: 2023-10-11

## 2023-10-12 NOTE — TELEPHONE ENCOUNTER
Orders faxed to [address] [place] [address] via [address] [place] [address] Rd.   [phone]

## 2023-10-12 NOTE — TELEPHONE ENCOUNTER
From: Lucas Schwartz  To: Bobby Weiss  Sent: 10/11/2023 1:53 PM CDT  Subject: Glucose test order to [facility] Team,     Can you please send/FAX my glucose test/order to the below ASA:     [facility] lab. [address]     [facility] not covered by our insurance. So i need to get these done from different labs which is in-network to our insurance plan.      Thanks,  Chelsea Reinoso ([phone])

## 2023-10-31 ENCOUNTER — ROUTINE PRENATAL (OUTPATIENT)
Dept: OBGYN CLINIC | Facility: CLINIC | Age: 23
End: 2023-10-31

## 2023-10-31 VITALS
DIASTOLIC BLOOD PRESSURE: 60 MMHG | SYSTOLIC BLOOD PRESSURE: 108 MMHG | HEART RATE: 83 BPM | BODY MASS INDEX: 24 KG/M2 | WEIGHT: 143.13 LBS

## 2023-10-31 DIAGNOSIS — Z3A.28 28 WEEKS GESTATION OF PREGNANCY: Primary | ICD-10-CM

## 2023-10-31 PROCEDURE — 90715 TDAP VACCINE 7 YRS/> IM: CPT | Performed by: STUDENT IN AN ORGANIZED HEALTH CARE EDUCATION/TRAINING PROGRAM

## 2023-10-31 PROCEDURE — 90471 IMMUNIZATION ADMIN: CPT | Performed by: STUDENT IN AN ORGANIZED HEALTH CARE EDUCATION/TRAINING PROGRAM

## 2023-10-31 PROCEDURE — 3074F SYST BP LT 130 MM HG: CPT | Performed by: STUDENT IN AN ORGANIZED HEALTH CARE EDUCATION/TRAINING PROGRAM

## 2023-10-31 PROCEDURE — 3078F DIAST BP <80 MM HG: CPT | Performed by: STUDENT IN AN ORGANIZED HEALTH CARE EDUCATION/TRAINING PROGRAM

## 2023-10-31 NOTE — PROGRESS NOTES
Return OB Visit 28-33 WGA      GA: 28w5d   10/31/23  1623   BP: 108/60   Pulse: 83   Weight: 143 lb 2 oz (64.9 kg)       Doing well, +FM  Denies LOF/VB/uctx  Rh positive, TDAP received, EPDS Depression Scale Total: 0 (10/10/2023 11:03 AM)   PTL and Fetal movement instructions given  3rd T HIV ordered  Pt said she did GTT today. Patient Active Problem List    Supervision of normal pregnancy      History of  delivery            PLTCS 10/2018 2/2 arrest of descent       Hx successful  (vaginal birth after ), currently pregnant            2022             [ x ] TOLAC consent      History of cholestasis during pregnancy            2022          RTC in 2 wks      Note to patient and family   The [facility] makes medical notes available to patients in the interest of transparency. However, please be advised that this is a medical document. It is intended as ojiw-su-evuv communication. It is written and medical language may contain abbreviations or verbiage that are technical and unfamiliar. It may appear blunt or direct. Medical documents are intended to carry relevant information, facts as evident, and the clinical opinion of the practitioner.       Danny Landers MD 100% of the time

## 2023-11-01 LAB
ABSOLUTE BASOPHILS: 23 CELLS/UL (ref 0–200)
ABSOLUTE EOSINOPHILS: 62 CELLS/UL (ref 15–500)
ABSOLUTE LYMPHOCYTES: 1694 CELLS/UL (ref 850–3900)
ABSOLUTE MONOCYTES: 370 CELLS/UL (ref 200–950)
ABSOLUTE NEUTROPHILS: 5552 CELLS/UL (ref 1500–7800)
BASOPHILS: 0.3 %
EOSINOPHILS: 0.8 %
GLUCOSE, GESTATIONAL SCREEN (50G)-130 CUTOFF: 78 MG/DL
HEMATOCRIT: 31 % (ref 35–45)
HEMOGLOBIN: 10.2 G/DL (ref 11.7–15.5)
LYMPHOCYTES: 22 %
MCH: 28 PG (ref 27–33)
MCHC: 32.9 G/DL (ref 32–36)
MCV: 85.2 FL (ref 80–100)
MONOCYTES: 4.8 %
MPV: 12.3 FL (ref 7.5–12.5)
NEUTROPHILS: 72.1 %
PLATELET COUNT: 252 THOUSAND/UL (ref 140–400)
RDW: 12.6 % (ref 11–15)
RED BLOOD CELL COUNT: 3.64 MILLION/UL (ref 3.8–5.1)
WHITE BLOOD CELL COUNT: 7.7 THOUSAND/UL (ref 3.8–10.8)

## 2023-11-14 ENCOUNTER — ROUTINE PRENATAL (OUTPATIENT)
Dept: OBGYN CLINIC | Facility: CLINIC | Age: 23
End: 2023-11-14
Payer: COMMERCIAL

## 2023-11-14 VITALS
WEIGHT: 145.81 LBS | HEIGHT: 65 IN | BODY MASS INDEX: 24.29 KG/M2 | DIASTOLIC BLOOD PRESSURE: 62 MMHG | HEART RATE: 93 BPM | SYSTOLIC BLOOD PRESSURE: 100 MMHG

## 2023-11-14 DIAGNOSIS — Z34.90 ENCOUNTER FOR SUPERVISION OF NORMAL PREGNANCY, ANTEPARTUM, UNSPECIFIED GRAVIDITY: Primary | ICD-10-CM

## 2023-11-14 PROCEDURE — 3008F BODY MASS INDEX DOCD: CPT | Performed by: NURSE PRACTITIONER

## 2023-11-14 PROCEDURE — 3078F DIAST BP <80 MM HG: CPT | Performed by: NURSE PRACTITIONER

## 2023-11-14 PROCEDURE — 3074F SYST BP LT 130 MM HG: CPT | Performed by: NURSE PRACTITIONER

## 2023-11-14 NOTE — PROGRESS NOTES
ANNA  Doing well, some pressure  GOOD FM  Denies VB/LOF/uctx  RTC in 2 wks  Fetal movement instructions given

## 2023-11-29 ENCOUNTER — ROUTINE PRENATAL (OUTPATIENT)
Dept: OBGYN CLINIC | Facility: CLINIC | Age: 23
End: 2023-11-29
Payer: COMMERCIAL

## 2023-11-29 VITALS
WEIGHT: 149 LBS | SYSTOLIC BLOOD PRESSURE: 110 MMHG | HEIGHT: 65 IN | HEART RATE: 100 BPM | BODY MASS INDEX: 24.83 KG/M2 | DIASTOLIC BLOOD PRESSURE: 70 MMHG

## 2023-11-29 DIAGNOSIS — Z3A.32 32 WEEKS GESTATION OF PREGNANCY: Primary | ICD-10-CM

## 2023-11-29 DIAGNOSIS — Z23 NEED FOR VACCINATION: ICD-10-CM

## 2023-11-29 PROCEDURE — 3078F DIAST BP <80 MM HG: CPT | Performed by: NURSE PRACTITIONER

## 2023-11-29 PROCEDURE — 3008F BODY MASS INDEX DOCD: CPT | Performed by: NURSE PRACTITIONER

## 2023-11-29 PROCEDURE — 90471 IMMUNIZATION ADMIN: CPT | Performed by: NURSE PRACTITIONER

## 2023-11-29 PROCEDURE — 90686 IIV4 VACC NO PRSV 0.5 ML IM: CPT | Performed by: NURSE PRACTITIONER

## 2023-11-29 PROCEDURE — 3074F SYST BP LT 130 MM HG: CPT | Performed by: NURSE PRACTITIONER

## 2023-11-29 NOTE — PROGRESS NOTES
Hugo Dang    Doing well, +FM  Denies contractions  Denies LOF, VB      FHT-P  PNL:  Reminded to complete previously ordered HIV  Mode of delivery: desires TOLAC  Immunizations: influenza today, s/p TDAP, discussed RSV vaccine - declines today   labor precautions reviewed  Fetal movement expectations discussed    Return in 2 weeks

## 2023-12-13 ENCOUNTER — ROUTINE PRENATAL (OUTPATIENT)
Dept: OBGYN CLINIC | Facility: CLINIC | Age: 23
End: 2023-12-13
Payer: COMMERCIAL

## 2023-12-13 VITALS
SYSTOLIC BLOOD PRESSURE: 116 MMHG | BODY MASS INDEX: 25 KG/M2 | HEART RATE: 72 BPM | DIASTOLIC BLOOD PRESSURE: 62 MMHG | WEIGHT: 152 LBS

## 2023-12-13 DIAGNOSIS — Z34.83 ENCOUNTER FOR SUPERVISION OF OTHER NORMAL PREGNANCY IN THIRD TRIMESTER: Primary | ICD-10-CM

## 2023-12-13 DIAGNOSIS — O26.843 UTERINE SIZE DATE DISCREPANCY PREGNANCY, THIRD TRIMESTER: ICD-10-CM

## 2023-12-13 PROCEDURE — 3074F SYST BP LT 130 MM HG: CPT | Performed by: OBSTETRICS & GYNECOLOGY

## 2023-12-13 PROCEDURE — 3078F DIAST BP <80 MM HG: CPT | Performed by: OBSTETRICS & GYNECOLOGY

## 2023-12-13 NOTE — PROGRESS NOTES
[id number] 34w6d seen for routine OB visit. Denies ctx, lof, vb. Reports good FM. Patient Active Problem List   Diagnosis    History of  delivery    Hx successful  (vaginal birth after ), currently pregnant    History of cholestasis during pregnancy    Supervision of normal pregnancy        TW lb (10.4 kg)   Depression Scale Total: 0 (10/10/2023 11:03 AM)      Gen: AAOx3, NAD  Resp: breathing comfortably  Abdomen: gravid, soft, nontender  Ext: nontender, no edema    Plan:  - S<D - growth US ordered  - s/p flu and tdap, declines RSV  - 3T HIV neg  - GBS next visit  - return precautions reviewed    RTO in 2 week(s).

## 2023-12-18 ENCOUNTER — TELEPHONE (OUTPATIENT)
Dept: OBGYN CLINIC | Facility: CLINIC | Age: 23
End: 2023-12-18

## 2023-12-28 ENCOUNTER — ULTRASOUND ENCOUNTER (OUTPATIENT)
Dept: OBGYN CLINIC | Facility: CLINIC | Age: 23
End: 2023-12-28
Payer: COMMERCIAL

## 2023-12-28 DIAGNOSIS — O26.843 UTERINE SIZE DATE DISCREPANCY PREGNANCY, THIRD TRIMESTER: ICD-10-CM

## 2023-12-29 ENCOUNTER — ROUTINE PRENATAL (OUTPATIENT)
Dept: OBGYN CLINIC | Facility: CLINIC | Age: 23
End: 2023-12-29
Payer: COMMERCIAL

## 2023-12-29 VITALS
WEIGHT: 155.25 LBS | HEIGHT: 65 IN | HEART RATE: 87 BPM | DIASTOLIC BLOOD PRESSURE: 68 MMHG | SYSTOLIC BLOOD PRESSURE: 104 MMHG | BODY MASS INDEX: 25.87 KG/M2

## 2023-12-29 DIAGNOSIS — Z87.59 HISTORY OF CHOLESTASIS DURING PREGNANCY: ICD-10-CM

## 2023-12-29 DIAGNOSIS — Z34.80 SUPERVISION OF OTHER NORMAL PREGNANCY, ANTEPARTUM: Primary | ICD-10-CM

## 2023-12-29 DIAGNOSIS — Z98.891 HISTORY OF CESAREAN DELIVERY: ICD-10-CM

## 2023-12-29 DIAGNOSIS — O34.219 HX SUCCESSFUL VBAC (VAGINAL BIRTH AFTER CESAREAN), CURRENTLY PREGNANT: ICD-10-CM

## 2023-12-29 DIAGNOSIS — Z87.19 HISTORY OF CHOLESTASIS DURING PREGNANCY: ICD-10-CM

## 2023-12-29 PROCEDURE — 3008F BODY MASS INDEX DOCD: CPT | Performed by: OBSTETRICS & GYNECOLOGY

## 2023-12-29 PROCEDURE — 3074F SYST BP LT 130 MM HG: CPT | Performed by: OBSTETRICS & GYNECOLOGY

## 2023-12-29 PROCEDURE — 3078F DIAST BP <80 MM HG: CPT | Performed by: OBSTETRICS & GYNECOLOGY

## 2023-12-29 NOTE — PROGRESS NOTES
ANNA  [id number]  GA: 37w1d  Vitals:    23 0751   BP: 104/68   Pulse: 87   Weight: 155 lb 4 oz (70.4 kg)   Height: 65\"       Doing well, +FM  Denies LOF/VB/uctx  Mode of delivery: TOLAC anticipated  SVE 0/50/-2   GBS collected and sent   Fetal movement count given  Labor precautions provided     Assessment:     Sigifredo Gregg is a 20year old  at 37w1d who presents for routine OB visit. Overall doing well. Problem List Items Addressed This Visit          Gastrointestinal and Abdominal    History of cholestasis during pregnancy       Gravid and     History of  delivery    Hx successful  (vaginal birth after ), currently pregnant    Supervision of normal pregnancy - Primary    Relevant Orders    Strep B Culture           Plan:     Patient Active Problem List    Diagnosis    Supervision of normal pregnancy    History of  delivery     PLTCS 10/2018 2/2 arrest of descent       Hx successful  (vaginal birth after ), currently pregnant     2022   [ x ] TOLAC consent      History of cholestasis during pregnancy     2022         - continue routine prenatal care   - labor and rupture of membrane precautions provided    Return to clinic in 1 week for ANNA visit             Note to patient and family   The [facility] makes medical notes available to patients in the interest of transparency. However, please be advised that this is a medical document. It is intended as ywwz-ss-boks communication. It is written and medical language may contain abbreviations or verbiage that are technical and unfamiliar. It may appear blunt or direct. Medical documents are intended to carry relevant information, facts as evident, and the clinical opinion of the practitioner.

## 2023-12-30 LAB — GROUP B STREP BY PCR FOR PCR OVT: NEGATIVE

## 2024-01-05 ENCOUNTER — TELEPHONE (OUTPATIENT)
Dept: OBGYN CLINIC | Facility: CLINIC | Age: 24
End: 2024-01-05

## 2024-01-05 ENCOUNTER — ROUTINE PRENATAL (OUTPATIENT)
Dept: OBGYN CLINIC | Facility: CLINIC | Age: 24
End: 2024-01-05
Payer: COMMERCIAL

## 2024-01-05 VITALS
HEART RATE: 80 BPM | BODY MASS INDEX: 26.33 KG/M2 | WEIGHT: 158 LBS | DIASTOLIC BLOOD PRESSURE: 68 MMHG | HEIGHT: 65 IN | SYSTOLIC BLOOD PRESSURE: 108 MMHG

## 2024-01-05 DIAGNOSIS — Z3A.38 38 WEEKS GESTATION OF PREGNANCY: Primary | ICD-10-CM

## 2024-01-05 PROCEDURE — 3008F BODY MASS INDEX DOCD: CPT | Performed by: STUDENT IN AN ORGANIZED HEALTH CARE EDUCATION/TRAINING PROGRAM

## 2024-01-05 PROCEDURE — 3078F DIAST BP <80 MM HG: CPT | Performed by: STUDENT IN AN ORGANIZED HEALTH CARE EDUCATION/TRAINING PROGRAM

## 2024-01-05 PROCEDURE — 3074F SYST BP LT 130 MM HG: CPT | Performed by: STUDENT IN AN ORGANIZED HEALTH CARE EDUCATION/TRAINING PROGRAM

## 2024-01-05 NOTE — TELEPHONE ENCOUNTER
----- Message from Dusty Hutchison MD sent at 1/5/2024 10:26 AM CST -----  Request IOL at/between this GA: 39-40 wks  Estimated Date of Delivery: 1/18/24  Indication for IOL:  elective   Time of appointment: AM  Cervical ripening with cytotec: NO  IOL with pitocin: yes, per protocol   OB history/complications: TOLAC    Dusty Hutchison MD

## 2024-01-05 NOTE — PROGRESS NOTES
RETURN OB 35-41 WGA      GA: 38w1d  Vitals:    24 1001   BP: 108/68   Pulse: 80   Weight: 158 lb (71.7 kg)   Height: 65\"       Doing well, +FM  Denies LOF/VB/uctx  Mode of delivery:   anticipated  SVE /-2   GBS neg  Fetal movement count given  Labor precautions provided   Cephalic on exam    Patient Active Problem List    Diagnosis    Supervision of normal pregnancy    History of  delivery     PLTCS 10/2018 2 arrest of descent       Hx successful  (vaginal birth after ), currently pregnant     2022   [ x ] TOLAC consent      History of cholestasis during pregnancy     2022             RTC 1 week            Note to patient and family   The  [employer] makes medical notes available to patients in the interest of transparency.  However, please be advised that this is a medical document.  It is intended as rwjh-in-arbd communication.  It is written and medical language may contain abbreviations or verbiage that are technical and unfamiliar.  It may appear blunt or direct.  Medical documents are intended to carry relevant information, facts as evident, and the clinical opinion of the practitioner.    Dusty Hutchison MD

## 2024-01-07 ENCOUNTER — PATIENT MESSAGE (OUTPATIENT)
Dept: OBGYN CLINIC | Facility: CLINIC | Age: 24
End: 2024-01-07

## 2024-01-11 ENCOUNTER — ROUTINE PRENATAL (OUTPATIENT)
Dept: OBGYN CLINIC | Facility: CLINIC | Age: 24
End: 2024-01-11
Payer: COMMERCIAL

## 2024-01-11 VITALS
HEIGHT: 65 IN | WEIGHT: 157.25 LBS | DIASTOLIC BLOOD PRESSURE: 70 MMHG | SYSTOLIC BLOOD PRESSURE: 104 MMHG | HEART RATE: 96 BPM | BODY MASS INDEX: 26.2 KG/M2

## 2024-01-11 DIAGNOSIS — O34.219 HX SUCCESSFUL VBAC (VAGINAL BIRTH AFTER CESAREAN), CURRENTLY PREGNANT: ICD-10-CM

## 2024-01-11 DIAGNOSIS — Z98.891 HISTORY OF CESAREAN DELIVERY: ICD-10-CM

## 2024-01-11 DIAGNOSIS — Z3A.39 39 WEEKS GESTATION OF PREGNANCY: Primary | ICD-10-CM

## 2024-01-11 PROCEDURE — 3078F DIAST BP <80 MM HG: CPT | Performed by: NURSE PRACTITIONER

## 2024-01-11 PROCEDURE — 3074F SYST BP LT 130 MM HG: CPT | Performed by: NURSE PRACTITIONER

## 2024-01-11 PROCEDURE — 3008F BODY MASS INDEX DOCD: CPT | Performed by: NURSE PRACTITIONER

## 2024-01-11 NOTE — PROGRESS NOTES
ANNA 39w0d    Doing well, +FM  Denies contractions  Denies LOF, VB      FHT-P  PNL:  GBS negative  Mode of delivery: Desires TOLAC, scheduled 1/16/2024   Immunizations: s/p influenza and TDAP  Labor precautions reviewed  Fetal movement expectations discussed    Return for PP visit or sooner if needed

## 2024-01-12 ENCOUNTER — TELEPHONE (OUTPATIENT)
Dept: OBGYN CLINIC | Facility: CLINIC | Age: 24
End: 2024-01-12

## 2024-01-12 NOTE — TELEPHONE ENCOUNTER
[facility] signed and I faxed back. Copy in plfd.

## 2024-01-16 ENCOUNTER — APPOINTMENT (OUTPATIENT)
Dept: OBGYN CLINIC | Facility: HOSPITAL | Age: 24
End: 2024-01-16
Payer: COMMERCIAL

## 2024-01-16 ENCOUNTER — ANESTHESIA EVENT (OUTPATIENT)
Dept: OBGYN UNIT | Facility: HOSPITAL | Age: 24
End: 2024-01-16
Payer: COMMERCIAL

## 2024-01-16 ENCOUNTER — ANESTHESIA (OUTPATIENT)
Dept: OBGYN UNIT | Facility: HOSPITAL | Age: 24
End: 2024-01-16
Payer: COMMERCIAL

## 2024-01-16 ENCOUNTER — HOSPITAL ENCOUNTER (INPATIENT)
Facility: HOSPITAL | Age: 24
LOS: 1 days | Discharge: HOME | End: 2024-01-17
Attending: OBSTETRICS & GYNECOLOGY | Admitting: OBSTETRICS & GYNECOLOGY
Payer: COMMERCIAL

## 2024-01-16 PROBLEM — Z34.90 PREGNANCY: Status: ACTIVE | Noted: 2024-01-16

## 2024-01-16 PROBLEM — O34.219 VBAC, DELIVERED (HCC): Status: ACTIVE | Noted: 2024-01-16

## 2024-01-16 PROBLEM — Z34.90 ENCOUNTER FOR INDUCTION OF LABOR: Status: ACTIVE | Noted: 2024-01-16

## 2024-01-16 PROBLEM — O34.219 VBAC, DELIVERED: Status: ACTIVE | Noted: 2024-01-16

## 2024-01-16 PROBLEM — Z34.90 ENCOUNTER FOR INDUCTION OF LABOR (HCC): Status: ACTIVE | Noted: 2024-01-16

## 2024-01-16 PROBLEM — Z34.90 PREGNANCY (HCC): Status: ACTIVE | Noted: 2024-01-16

## 2024-01-16 LAB
ANTIBODY SCREEN: NEGATIVE
BASOPHILS # BLD AUTO: 0.06 X10(3) UL (ref 0–0.2)
BASOPHILS NFR BLD AUTO: 0.6 %
EOSINOPHIL # BLD AUTO: 0.07 X10(3) UL (ref 0–0.7)
EOSINOPHIL NFR BLD AUTO: 0.7 %
ERYTHROCYTE [DISTWIDTH] IN BLOOD BY AUTOMATED COUNT: 14.2 %
HCT VFR BLD AUTO: 36 %
HGB BLD-MCNC: 12.1 G/DL
IMM GRANULOCYTES # BLD AUTO: 0.21 X10(3) UL (ref 0–1)
IMM GRANULOCYTES NFR BLD: 2.1 %
LYMPHOCYTES # BLD AUTO: 1.99 X10(3) UL (ref 1–4)
LYMPHOCYTES NFR BLD AUTO: 20 %
MCH RBC QN AUTO: 28 PG (ref 26–34)
MCHC RBC AUTO-ENTMCNC: 33.6 G/DL (ref 31–37)
MCV RBC AUTO: 83.3 FL
MONOCYTES # BLD AUTO: 0.52 X10(3) UL (ref 0.1–1)
MONOCYTES NFR BLD AUTO: 5.2 %
NEUTROPHILS # BLD AUTO: 7.1 X10 (3) UL (ref 1.5–7.7)
NEUTROPHILS # BLD AUTO: 7.1 X10(3) UL (ref 1.5–7.7)
NEUTROPHILS NFR BLD AUTO: 71.4 %
PLATELET # BLD AUTO: 156 10(3)UL (ref 150–450)
RBC # BLD AUTO: 4.32 X10(6)UL
RH BLOOD TYPE: POSITIVE
T PALLIDUM AB SER QL IA: NONREACTIVE
WBC # BLD AUTO: 10 X10(3) UL (ref 4–11)

## 2024-01-16 PROCEDURE — 3E033VJ INTRODUCTION OF OTHER HORMONE INTO PERIPHERAL VEIN, PERCUTANEOUS APPROACH: ICD-10-PCS | Performed by: OBSTETRICS & GYNECOLOGY

## 2024-01-16 PROCEDURE — 10907ZC DRAINAGE OF AMNIOTIC FLUID, THERAPEUTIC FROM PRODUCTS OF CONCEPTION, VIA NATURAL OR ARTIFICIAL OPENING: ICD-10-PCS | Performed by: OBSTETRICS & GYNECOLOGY

## 2024-01-16 RX ORDER — DOCUSATE SODIUM 100 MG/1
100 CAPSULE, LIQUID FILLED ORAL
Status: DISCONTINUED | OUTPATIENT
Start: 2024-01-16 | End: 2024-01-16

## 2024-01-16 RX ORDER — BUPIVACAINE HCL/0.9 % NACL/PF 0.25 %
5 PLASTIC BAG, INJECTION (ML) EPIDURAL AS NEEDED
Status: DISCONTINUED | OUTPATIENT
Start: 2024-01-16 | End: 2024-01-16

## 2024-01-16 RX ORDER — ACETAMINOPHEN 500 MG
1000 TABLET ORAL EVERY 6 HOURS PRN
Status: DISCONTINUED | OUTPATIENT
Start: 2024-01-16 | End: 2024-01-17

## 2024-01-16 RX ORDER — IBUPROFEN 600 MG/1
600 TABLET ORAL EVERY 6 HOURS
Status: DISCONTINUED | OUTPATIENT
Start: 2024-01-16 | End: 2024-01-17

## 2024-01-16 RX ORDER — ONDANSETRON 2 MG/ML
4 INJECTION INTRAMUSCULAR; INTRAVENOUS EVERY 6 HOURS PRN
Status: DISCONTINUED | OUTPATIENT
Start: 2024-01-16 | End: 2024-01-16 | Stop reason: HOSPADM

## 2024-01-16 RX ORDER — NALBUPHINE HYDROCHLORIDE 10 MG/ML
2.5 INJECTION, SOLUTION INTRAMUSCULAR; INTRAVENOUS; SUBCUTANEOUS
Status: DISCONTINUED | OUTPATIENT
Start: 2024-01-16 | End: 2024-01-16

## 2024-01-16 RX ORDER — TERBUTALINE SULFATE 1 MG/ML
0.25 INJECTION, SOLUTION SUBCUTANEOUS AS NEEDED
Status: DISCONTINUED | OUTPATIENT
Start: 2024-01-16 | End: 2024-01-16 | Stop reason: HOSPADM

## 2024-01-16 RX ORDER — LIDOCAINE HYDROCHLORIDE 10 MG/ML
INJECTION, SOLUTION EPIDURAL; INFILTRATION; INTRACAUDAL; PERINEURAL AS NEEDED
Status: DISCONTINUED | OUTPATIENT
Start: 2024-01-16 | End: 2024-01-16 | Stop reason: SURG

## 2024-01-16 RX ORDER — CALCIUM CARBONATE 500 MG/1
1000 TABLET, CHEWABLE ORAL EVERY 4 HOURS PRN
Status: DISCONTINUED | OUTPATIENT
Start: 2024-01-16 | End: 2024-01-16 | Stop reason: HOSPADM

## 2024-01-16 RX ORDER — DEXTROSE, SODIUM CHLORIDE, SODIUM LACTATE, POTASSIUM CHLORIDE, AND CALCIUM CHLORIDE 5; .6; .31; .03; .02 G/100ML; G/100ML; G/100ML; G/100ML; G/100ML
INJECTION, SOLUTION INTRAVENOUS AS NEEDED
Status: DISCONTINUED | OUTPATIENT
Start: 2024-01-16 | End: 2024-01-16 | Stop reason: HOSPADM

## 2024-01-16 RX ORDER — BISACODYL 10 MG
10 SUPPOSITORY, RECTAL RECTAL ONCE AS NEEDED
Status: DISCONTINUED | OUTPATIENT
Start: 2024-01-16 | End: 2024-01-16

## 2024-01-16 RX ORDER — ACETAMINOPHEN 500 MG
500 TABLET ORAL EVERY 6 HOURS PRN
Status: DISCONTINUED | OUTPATIENT
Start: 2024-01-16 | End: 2024-01-17

## 2024-01-16 RX ORDER — ACETAMINOPHEN 500 MG
1000 TABLET ORAL EVERY 6 HOURS PRN
Status: DISCONTINUED | OUTPATIENT
Start: 2024-01-16 | End: 2024-01-16 | Stop reason: HOSPADM

## 2024-01-16 RX ORDER — SODIUM CHLORIDE, SODIUM LACTATE, POTASSIUM CHLORIDE, CALCIUM CHLORIDE 600; 310; 30; 20 MG/100ML; MG/100ML; MG/100ML; MG/100ML
INJECTION, SOLUTION INTRAVENOUS CONTINUOUS
Status: DISCONTINUED | OUTPATIENT
Start: 2024-01-16 | End: 2024-01-16 | Stop reason: HOSPADM

## 2024-01-16 RX ORDER — BISACODYL 10 MG
10 SUPPOSITORY, RECTAL RECTAL ONCE AS NEEDED
Status: DISCONTINUED | OUTPATIENT
Start: 2024-01-16 | End: 2024-01-17

## 2024-01-16 RX ORDER — LIDOCAINE HYDROCHLORIDE AND EPINEPHRINE 15; 5 MG/ML; UG/ML
INJECTION, SOLUTION EPIDURAL AS NEEDED
Status: DISCONTINUED | OUTPATIENT
Start: 2024-01-16 | End: 2024-01-16 | Stop reason: SURG

## 2024-01-16 RX ORDER — CITRIC ACID/SODIUM CITRATE 334-500MG
30 SOLUTION, ORAL ORAL AS NEEDED
Status: DISCONTINUED | OUTPATIENT
Start: 2024-01-16 | End: 2024-01-16 | Stop reason: HOSPADM

## 2024-01-16 RX ORDER — SIMETHICONE 80 MG
80 TABLET,CHEWABLE ORAL 3 TIMES DAILY PRN
Status: DISCONTINUED | OUTPATIENT
Start: 2024-01-16 | End: 2024-01-17

## 2024-01-16 RX ORDER — IBUPROFEN 600 MG/1
600 TABLET ORAL EVERY 6 HOURS
Status: DISCONTINUED | OUTPATIENT
Start: 2024-01-16 | End: 2024-01-16

## 2024-01-16 RX ORDER — SIMETHICONE 80 MG
80 TABLET,CHEWABLE ORAL 3 TIMES DAILY PRN
Status: DISCONTINUED | OUTPATIENT
Start: 2024-01-16 | End: 2024-01-16

## 2024-01-16 RX ORDER — CHOLECALCIFEROL (VITAMIN D3) 25 MCG
1 TABLET,CHEWABLE ORAL DAILY
Status: DISCONTINUED | OUTPATIENT
Start: 2024-01-16 | End: 2024-01-17

## 2024-01-16 RX ORDER — IBUPROFEN 600 MG/1
600 TABLET ORAL ONCE AS NEEDED
Status: DISCONTINUED | OUTPATIENT
Start: 2024-01-16 | End: 2024-01-16 | Stop reason: HOSPADM

## 2024-01-16 RX ORDER — ACETAMINOPHEN 500 MG
500 TABLET ORAL EVERY 6 HOURS PRN
Status: DISCONTINUED | OUTPATIENT
Start: 2024-01-16 | End: 2024-01-16 | Stop reason: HOSPADM

## 2024-01-16 RX ORDER — DOCUSATE SODIUM 100 MG/1
100 CAPSULE, LIQUID FILLED ORAL
Status: DISCONTINUED | OUTPATIENT
Start: 2024-01-16 | End: 2024-01-17

## 2024-01-16 RX ADMIN — LIDOCAINE HYDROCHLORIDE AND EPINEPHRINE 5 ML: 15; 5 INJECTION, SOLUTION EPIDURAL at 15:01:00

## 2024-01-16 RX ADMIN — LIDOCAINE HYDROCHLORIDE 10 MG: 10 INJECTION, SOLUTION EPIDURAL; INFILTRATION; INTRACAUDAL; PERINEURAL at 14:54:00

## 2024-01-16 NOTE — ANESTHESIA PROCEDURE NOTES
Labor Analgesia    Date/Time: 1/16/2024 2:53 PM    Performed by: Jona Jhaveri MD  Authorized by: Jona Jhaveri MD      General Information and Staff    Start Time:  1/16/2024 2:53 PM  End Time:  1/16/2024 3:00 PM  Anesthesiologist:  Jona Jhaveri MD  Performed by:  Anesthesiologist  Patient Location:  OB  Site Identification: surface landmarks    Reason for Block: labor epidural    Preanesthetic Checklist: patient identified, IV checked, risks and benefits discussed, monitors and equipment checked, pre-op evaluation, timeout performed, IV bolus, anesthesia consent and sterile technique used      Procedure Details    Patient Position:  Sitting  Prep: ChloraPrep    Monitoring:  Heart rate and continuous pulse ox  Approach:  Midline    Epidural Needle    Injection Technique:  ELISA air  Needle Type:  Tuohy  Needle Gauge:  17 G  Needle Length:  3.375 in  Needle Insertion Depth:  4.5  Location:  L3-4    Spinal Needle      Catheter    Catheter Type:  End hole  Catheter Size:  19 G  Test Dose:  Negative    Assessment    Sensory Level:  T8    Additional Comments

## 2024-01-16 NOTE — L&D DELIVERY NOTE
Roberto Carlos Girl [[id number]]      Labor Events     labor?: No   steroids?: None  Antibiotics received during labor?: No  Rupture date/time: 2024 1025     Rupture type: AROM  Fluid color: Clear  Labor type: Induced Onset of Labor  Induction: Oxytocin  Indications for induction: Elective       Labor Length    1st stage: 1h 07m  2nd stage: 0h 09m  3rd stage: 0h 04m       Labor Event Times    Labor onset date/time: 2024 1530  Dilation complete date/time: 2024 1637  Start pushing date/time: 2024 1644       [place] Presentation    Presentation: Vertex  Position: Right Occiput Anterior       Operative Delivery    Operative Vaginal Delivery: No                      Shoulder Dystocia    Shoulder Dystocia: No             Anesthesia    Method: Epidural              [place] Delivery      Delivery date/time:  24 16:46:00   Delivery type: Vaginal birth after caesarian    Details:     Delivery location: delivery room       Delivery Providers    Delivering Clinician: Tuyet Lima MD   Delivery personnel:  Provider Role   Mariah Ellsworth RN Baby Nurse   Deena Cote, RN Delivery Nurse             Cord    Vessels: 3 Vessels  Complications: None  Timed cord clamping: Yes  Time in sec: 60  Cord blood disposition: to lab  Gases sent?: No       Resuscitation    Method: None        Measurements      Weight: 3690 g 8 lb 2.2 oz Length: 52.1 cm     Head circum.: 36.5 cm Chest circum.: 33.5 cm          Abdominal circum.: 30 cm           Placenta    Date/time: 2024 1650  Removal: Spontaneous  Appearance: Intact  Disposition: held for future pathology       Apgars    Living status: Living   Apgar Scoring Key:    0 1 2    Skin color Blue or pale Acrocyanotic Completely pink    Heart rate Absent <100 bpm >100 bpm    Reflex irritability No response Grimace Cry or active withdrawal    Muscle tone Limp Some flexion Active motion    Respiratory effort Absent Weak cry; hypoventilation  Good, crying              1 Minute:  5 Minute:  10 Minute:  15 Minute:  20 Minute:      Skin color: 1  1       Heart rate: 2  2       Reflex irritablity: 2  2       Muscle tone: 2  2       Respiratory effort: 2  2       Total: 9  9          Apgars assigned by: LUCITA ACOSTA RN   disposition: with mother       Skin to Skin    Skin to skin initiated date/time: 2024 1658  Skin to skin with: Mother       Vaginal Count    Initial count RN: Deena Cote RN  Initial count Tech: Mytych, Amber R   Sponges   Sharps    Initial counts 11   0    Final counts 11   0    Final count RN: Deena Cote, RN  Final count MD: Tuyet Potts MD       Delivery (Maternal)    Episiotomy: None  Perineal lacerations: None      Vaginal laceration?: No      Cervical laceration?: No    Clitoral laceration?: No    Quantitative blood loss (mL): 104            Called by RN that patient was completely dilated with urge to push. Head was delivered over intact perineum with maternal effort in OA position. No nuchal cord noted. Anterior (left) shoulder was delivered with maternal effort and gentle downward traction. Posterior shoulder and body followed easily. Mouth and nose were bulb suctioned and baby was placed on maternal abdomen for drying/stimulation. Vigorous cry. Cord was clamped x2 after delay and cut by FOB.  Cord blood collected. Placenta delivered complete with gentle cord traction. Uterus firm with massage, IV Pitocin given, minimal bleeding. Vagina/cervix examined and found to be intact.   Counts were correct at the end of the delivery. QBL 104cc.  Mother and baby girl doing well, plan for transfer to Mother/Baby after initial recovery.     Tuyet Potts MD  [facility]  2024 5:51 PM

## 2024-01-16 NOTE — PROGRESS NOTES
at 39w5d admitted for IOL as TOLAC.    Vitals:    24 0958 24 1002 24 1230 24 1401   BP:  113/75 120/69 118/71   BP Location:  Right arm Right arm Right arm   Pulse:  80 66 80   Resp:  18 20 20   Temp:  98.7 °F (37.1 °C) 98.6 °F (37 °C) 98.6 °F (37 °C)   TempSrc:  Oral Oral Oral   Weight: 157 lb (71.2 kg) 157 lb (71.2 kg)     Height:  65\"           FHT: 140, moderate variability, + accels, no decels  [place]: irregular ctx, q3-5 min    SVE: 4/70/-3 per RN    Labor progress:  - continue Pitocin per protocol  - s/p AROM with clear fluid   - fetal wellbeing reassuring with cat 1 FHT  - pain - epidural encouraged    Dispo: Continue IOL, anticipate     Tuyet Potts MD  [facility]  2024 2:16 PM

## 2024-01-16 NOTE — H&P
[facility]  Obstetrics and Gynecology  History & Physical    Aisha Azevedo Patient Status:  Inpatient    2000 MRN [id number]   Location [facility] LABOR & DELIVERY Attending Tuyet Lima MD   Hospital Day 0 PCP No primary care provider on file.     CC: Patient is here for IOL as TOLAC    SUBJECTIVE:  Aisha Azevedo is a 23 year old  female at 39w5d Estimated Date of Delivery: 24 who is being admitted for induction of labor 2/2 full term, h/o CS and . Patient reports no complaints and good fetal movement.       DOC Confirmation  LMP: Patient's last menstrual period was 2023.  DOC: 2024, by Last Menstrual Period     OB Ultrasounds:   24: OB US follow up growth scan. EFW 2917 g (8uli2bf), 38.6 %. AC 21.7%. 2D measurements wnl. GUMARO 19.25 cm.  BPM. Postion:  Cephalic. fundal placenta.       Obstetric History:   OB History    Para Term  AB Living   3 2 2     2   SAB IAB Ectopic Multiple Live Births           2      # Outcome Date GA Lbr Barney/2nd Weight Sex Delivery Anes PTL Lv   3 Current            2 Term 20 37w0d  6 lb 10.9 oz (3.03 kg) F    ZAINA   1 Term 10/30/18 40w0d  8 lb 9 oz (3.884 kg) F Caesarean   ZAINA      Obstetric Comments   Cholestasis of pregnancy in 2022   PLTCS 10/2018 2/2 arrest of descent (pushed for 4 hours)        Past Medical History:   Past Medical History:   Diagnosis Date    History of cholestasis during pregnancy 2022    History of         Past Surgical History:   Past Surgical History:   Procedure Laterality Date     DELIVERY+POSTPARTUM CARE  10/2018    CHOLECYSTECTOMY       CLASS  2022       Family History: History reviewed. No pertinent family history.      Social History:   Social History     Tobacco Use    Smoking status: Never     Passive exposure: Never    Smokeless tobacco: Never   Substance Use Topics    Alcohol use: Not Currently       Home Meds:   Medications Prior to  Admission   Medication Sig Dispense Refill Last Dose    IRON-FOLIC ACID OR Take by mouth.   1/15/2024    Prenatal Multivit-Min-Fe-FA (PRENATAL VITAMINS OR) Take by mouth.   1/15/2024     Allergies: No Known Allergies        OBJECTIVE:    Temp:  [98.7 °F (37.1 °C)] 98.7 °F (37.1 °C)  Pulse:  [80] 80  Resp:  [18] 18  BP: (113)/(75) 113/75  Body mass index is 26.13 kg/m².    General: AAO. NAD.   Lungs: CTAB  CV: regular rate and rhythm  Abdomen: soft, nontender, nondistended, no abnormal masses, no epigastric pain, gravid   Extremities: negative edema bilaterally, negative calf tenderness bilaterally, Mary's sign negative bilaterally   SVE: 3/70/-3    Reactive NST - baseline 130, moderate variability, +15x15 accels, no decels  [place] - ctx irregular    Prenatal Labs Brief Review   Blood Type:   Lab Results   Component Value Date    ABO O 2023    RH RH(D) POSITIVE 2023     Lab Results   Component Value Date    GBS Negative 2023          ASSESSMENT/ PLAN:    Aisha Azevedo is a 23 year old  female at 39w5d Estimated Date of Delivery: 24 by LMP c/w 13wk US who is being admitted for IOL as TOLAC, h/o .    Patient Active Problem List   Diagnosis    History of  delivery    Hx successful  (vaginal birth after ), currently pregnant    History of cholestasis during pregnancy    Supervision of normal pregnancy    Pregnancy       1. Labor: AROM at 1030, will augment with Pitocin as indicated  2. Fetal monitoring: CEFM, reactive cat 1 on admission  3. GBS: neg  4. Pain: epidural prn    Risks, benefits, alternatives and possible complications have been discussed in detail with the patient.  Pre-admission, admission, and post admission procedures and expectations were discussed in detail.  All questions answered, all appropriate consents will be signed at the Hospital. Admission is planned for today.     Admit to L&D, anticipate .    Tuyet Potts MD  [facility]  2024  10:28 AM

## 2024-01-16 NOTE — PROGRESS NOTES
Pt is a 23 year old female admitted to 105/105-A.     Chief Complaint   Patient presents with    Scheduled Induction      Pt is  39w5d intra-uterine pregnancy.  History obtained. Oriented to room, staff, and plan of care.

## 2024-01-16 NOTE — ANESTHESIA PREPROCEDURE EVALUATION
PRE-OP EVALUATION    Patient Name: Aisha Azevedo    Admit Diagnosis: PREGNANCY  Pregnancy    Pre-op Diagnosis: * No pre-op diagnosis entered *        Anesthesia Procedure: LABOR ANALGESIA    * No surgeons found in log *    Pre-op vitals reviewed.  Temp: 98.6 °F (37 °C)  Pulse: 80  Resp: 20  BP: 118/71     Body mass index is 26.13 kg/m².    Current medications reviewed.  Hospital Medications:  • lactated ringers infusion   Intravenous Continuous   • dextrose in lactated ringers 5% infusion   Intravenous PRN   • lactated ringers IV bolus 500 mL  500 mL Intravenous PRN   • acetaminophen (Tylenol Extra Strength) tab 500 mg  500 mg Oral Q6H PRN   • acetaminophen (Tylenol Extra Strength) tab 1,000 mg  1,000 mg Oral Q6H PRN   • ibuprofen (Motrin) tab 600 mg  600 mg Oral Once PRN   • ondansetron (Zofran) 4 MG/2ML injection 4 mg  4 mg Intravenous Q6H PRN   • oxyTOCIN in sodium chloride 0.9% (Pitocin) 30 Units/500mL infusion premix  62.5-900 claribel-units/min Intravenous Continuous   • terbutaline (Brethine) 1 MG/ML injection 0.25 mg  0.25 mg Subcutaneous PRN   • sodium citrate-citric acid (Bicitra) 500-334 MG/5ML oral solution 30 mL  30 mL Oral PRN   • calcium carbonate (Tums) chewable tab 1,000 mg  1,000 mg Oral Q4H PRN   • oxyTOCIN in sodium chloride 0.9% (Pitocin) 30 Units/500mL infusion premix  0.5-20 claribel-units/min Intravenous Continuous   • lactated ringers IV bolus 1,000 mL  1,000 mL Intravenous Once   • fentaNYL-bupivacaine 2 mcg/mL-0.125% in sodium chloride 0.9% 100 mL EPIDURAL infusion premix  12 mL/hr Epidural Continuous   • [COMPLETED] fentaNYL (Sublimaze) 50 mcg/mL injection 100 mcg  100 mcg Epidural Once   • EPHEDrine (PF) 25 MG/5 ML injection 5 mg  5 mg Intravenous PRN   • nalbuphine (Nubain) 10 mg/mL injection 2.5 mg  2.5 mg Intravenous Q15 Min PRN   • lidocaine PF (Xylocaine-MPF) 1% injection   Infiltration PRN   • lidocaine 1.5%-EPINEPHrine 1:200,000 (Xylocaine-Epinephrine) injection   Epidural PRN        Outpatient Medications:     Medications Prior to Admission   Medication Sig Dispense Refill Last Dose   • IRON-FOLIC ACID OR Take by mouth.   1/15/2024   • Prenatal Multivit-Min-Fe-FA (PRENATAL VITAMINS OR) Take by mouth.   1/15/2024       Allergies: Patient has no known allergies.      Anesthesia Evaluation    Patient summary reviewed.    Anesthetic Complications           GI/Hepatic/Renal                                 Cardiovascular                                                       Endo/Other                                  Pulmonary                           Neuro/Psych                                    Past Surgical History:   Procedure Laterality Date   •  DELIVERY+POSTPARTUM CARE  10/2018   • CHOLECYSTECTOMY     •  CLASS  2022     Social History     Socioeconomic History   • Marital status:    Tobacco Use   • Smoking status: Never     Passive exposure: Never   • Smokeless tobacco: Never   Vaping Use   • Vaping Use: Never used   Substance and Sexual Activity   • Alcohol use: Not Currently   • Drug use: Not Currently   • Sexual activity: Yes     Partners: Male   Other Topics Concern   • Caffeine Concern Yes   • Exercise Yes   • Seat Belt Yes     History   Drug Use Unknown     Available pre-op labs reviewed.  Lab Results   Component Value Date    WBC 10.0 2024    WBC 7.7 10/31/2023    RBC 4.32 2024    RBC 3.64 (L) 10/31/2023    HGB 12.1 2024    HGB 10.2 (L) 10/31/2023    HCT 36.0 2024    HCT 31.0 (L) 10/31/2023    MCV 83.3 2024    MCV 85.2 10/31/2023    MCH 28.0 2024    MCH 28.0 10/31/2023    MCHC 33.6 2024    MCHC 32.9 10/31/2023    RDW 14.2 2024    RDW 12.6 10/31/2023    .0 2024     10/31/2023               Airway      Mallampati: II  Mouth opening: 3 FB  TM distance: > 6 cm  Neck ROM: full Cardiovascular    Cardiovascular exam normal.  Rhythm: regular  Rate: normal     Dental              Pulmonary    Pulmonary exam normal.                 Other findings        ASA: 2 and emergent  Plan: epidural  NPO status verified and patient meets guidelines.          Plan/risks discussed with: patient and spouse            Present on Admission:  • Hx successful  (vaginal birth after ), currently pregnant  • History of  delivery

## 2024-01-17 VITALS
WEIGHT: 157 LBS | HEART RATE: 56 BPM | DIASTOLIC BLOOD PRESSURE: 72 MMHG | SYSTOLIC BLOOD PRESSURE: 105 MMHG | RESPIRATION RATE: 16 BRPM | BODY MASS INDEX: 26.16 KG/M2 | HEIGHT: 65 IN | TEMPERATURE: 98 F

## 2024-01-17 LAB
BASOPHILS # BLD AUTO: 0.03 X10(3) UL (ref 0–0.2)
BASOPHILS NFR BLD AUTO: 0.2 %
EOSINOPHIL # BLD AUTO: 0.1 X10(3) UL (ref 0–0.7)
EOSINOPHIL NFR BLD AUTO: 0.7 %
ERYTHROCYTE [DISTWIDTH] IN BLOOD BY AUTOMATED COUNT: 14.2 %
HCT VFR BLD AUTO: 31.7 %
HGB BLD-MCNC: 10.5 G/DL
IMM GRANULOCYTES # BLD AUTO: 0.13 X10(3) UL (ref 0–1)
IMM GRANULOCYTES NFR BLD: 0.9 %
LYMPHOCYTES # BLD AUTO: 2.05 X10(3) UL (ref 1–4)
LYMPHOCYTES NFR BLD AUTO: 14.5 %
MCH RBC QN AUTO: 27.4 PG (ref 26–34)
MCHC RBC AUTO-ENTMCNC: 33.1 G/DL (ref 31–37)
MCV RBC AUTO: 82.8 FL
MONOCYTES # BLD AUTO: 0.61 X10(3) UL (ref 0.1–1)
MONOCYTES NFR BLD AUTO: 4.3 %
NEUTROPHILS # BLD AUTO: 11.19 X10 (3) UL (ref 1.5–7.7)
NEUTROPHILS # BLD AUTO: 11.19 X10(3) UL (ref 1.5–7.7)
NEUTROPHILS NFR BLD AUTO: 79.4 %
PLATELET # BLD AUTO: 152 10(3)UL (ref 150–450)
RBC # BLD AUTO: 3.83 X10(6)UL
WBC # BLD AUTO: 14.1 X10(3) UL (ref 4–11)

## 2024-01-17 NOTE — PROGRESS NOTES
OB progress note    23 year old  female PPD#1 from  at 39w5d on 24 after IOL     Afebrile, VSS  Pain controlled  Rh+, GBS neg   Ambulation encouraged  Disposition desires discharge home today     Subjective: Pain controlled. Lochia normal. Eating, ambulating, voiding without difficulty. Is breastfeeding. No other complaints.     Vitals:    24 1804 24 1835 24 1924 24 0752   BP: (!) 78/58 108/65 107/72 105/72   BP Location:  Right arm Left arm Left arm   Pulse:  65 72 56   Resp:  20 18 16   Temp:  97.9 °F (36.6 °C) 98.2 °F (36.8 °C) 98 °F (36.7 °C)   TempSrc:  Oral Oral Oral   Weight:       Height:         Temp:  [97.8 °F (36.6 °C)-98.6 °F (37 °C)] 98 °F (36.7 °C)  Pulse:  [] 56  Resp:  [16-20] 16  BP: ()/(58-86) 105/72      ibuprofen  600 mg Oral Q6H    docusate sodium  100 mg Oral BID@0600,1800    prenatal vitamin with DHA  1 capsule Oral Daily       Exam:  Gen A&O, NAD  CV RRR  Lungs CTAB  Abd soft, nontender, fundus firm under umbilicus  Ext nontender, no edema    Recent Labs   Lab 24  0821   WBC 14.1*   HGB 10.5*   .0   NE 11.19*       Yanique Abbott MD

## 2024-01-17 NOTE — PROGRESS NOTES
Labor Analgesia Follow Up Note    Patient underwent epidural anesthesia for labor analgesia,    Placenta Date/Time: 1/16/2024  4:50 PM     Delivery Date/Time:: 1/16/2024   4:46 PM     /72 (BP Location: Left arm)   Pulse 56   Temp 98 °F (36.7 °C) (Oral)   Resp 16   Ht 1.651 m (5' 5\")   Wt 71.2 kg (157 lb)   LMP 04/13/2023   Breastfeeding Yes   BMI 26.13 kg/m²     Assessment:  Patient seen and no apparent anesthesia related complications.    Thank you for asking us to participate in the care of your patient.

## 2024-01-17 NOTE — PROGRESS NOTES
Pt transferred to Mother Baby room 2196 in stable condition. Report given to YSABEL Mazariegos. Infant transferred with mother in stable condition.

## 2024-01-17 NOTE — DISCHARGE SUMMARY
[facility]    Discharge Summary    Aisha Azevedo Patient Status:  Inpatient    2000 MRN [id number]   Location [facility] 2SW-J Attending Tuyet Lima MD   Hosp Day # 1 PCP No primary care provider on file.     Date of Admission: 2024    Date of Discharge: 2024     Admission Diagnoses:   IUP at 39w5d   History of  section  Desiring trial of labor after  section  Encounter for induction of labor     Discharge Diagnosis:   Status post induction of labor   Status post vaginal birth after  at 39w5d     Primary OB Clinician: Delroy Kim Juarez, [facility] Course:     23 year old  at 39w5d female with history of  section x 1 in 2018 for arrest of descent with 8 lb 9 oz infant (pushed 4 hours) followed by  of 6 lb 10.9 oz infant in 2022, presented for elective induction of labor desiring trial of labor after  section. Her cervix was favorable. IV oxytocin, amniotomy, epidural.     Delivered viable female infant via non instrumented vaginal birth after delivery on 24.     Discharge Plan:   Discharge Condition: Stable  Early Discharge:  NO    Discharge medications:  Current Discharge Medication List        Home Meds - Unchanged    Details   IRON-FOLIC ACID OR Take by mouth.      Prenatal Multivit-Min-Fe-FA (PRENATAL VITAMINS OR) Take by mouth.                   Discharge Diet: As tolerated    Discharge Activity: As tolerated    Follow up:      Follow-up Information       Tuyet Lima MD Follow up in 6 week(s).    Specialty: OBSTETRICS & GYNECOLOGY  Why: Post Partum appointment  Contact information:  [address]  [address] [address]  [phone]                             Follow up Labs:          Other Discharge Instructions:         Nothing in the vagina for 6 weeks   No strenuous activity/exercise  May shower immediately. May take bath  Keep wound(s) clean and dry. Wash daily with warm water & soap.  Do not scrub. Gently pat dry. May cover with clean gauze or pad if needed.     AVOID CONSTIPATION:   -Take Miralax one capful in water or juice each morning.  You can also take each evening iif needed.  -Take Fiber supplement along with Miralax as well.  -May also take milk of magnesia or Dulcolax over the counter if needing to have a BM more urgently than Miralax is providing    -Do NOT strain for bowel movements    Please call office if:  -fever 100.4 or higher    Please proceed to the Emergency Department at [facility] for any of the following:   -vaginal bleeding soaking greater than 1 pad per hour  -severe pelvic pain  -shortness of breath  -chest pain  -leg pain or swelling         Yanique Abbott MD  1/17/2024

## 2024-01-17 NOTE — PROGRESS NOTES
Received in MBU- stable condition.  Oriented to room, side rails up X2, bed in low position, call light within reach.  Plan of care reviewed with pt.  Pt agrees to the plan of care.

## 2024-01-17 NOTE — DISCHARGE INSTRUCTIONS
Nothing in the vagina for 6 weeks   No strenuous activity/exercise  May shower immediately. May take bath  Keep wound(s) clean and dry. Wash daily with warm water & soap. Do not scrub. Gently pat dry. May cover with clean gauze or pad if needed.     AVOID CONSTIPATION:   -Take Miralax one capful in water or juice each morning.  You can also take each evening iif needed.  -Take Fiber supplement along with Miralax as well.  -May also take milk of magnesia or Dulcolax over the counter if needing to have a BM more urgently than Miralax is providing    -Do NOT strain for bowel movements    Please call office if:  -fever 100.4 or higher    Please proceed to the Emergency Department at [facility] for any of the following:   -vaginal bleeding soaking greater than 1 pad per hour  -severe pelvic pain  -shortness of breath  -chest pain  -leg pain or swelling

## 2024-01-18 ENCOUNTER — PATIENT OUTREACH (OUTPATIENT)
Dept: CASE MANAGEMENT | Age: 24
End: 2024-01-18

## 2024-01-18 NOTE — PROGRESS NOTES
OBGYN Post Partum apt request (delivered 01/16)    Dr Tuyet Potts  [facility]  [address] [place] [address] [place] [id number] [phone]  Follow up 6 weeks  Apt made:  Tue 02/27 @3:45pm - [place]  Pt asked for Peds PCP apt  Dr George [place] [address] [phone]  Apt made:  Mon 01/22 @11:20am  Confirmed w/pt , [place]  Closing encounter

## 2024-01-18 NOTE — PROGRESS NOTES
Patient discharged home with family. Discharge instructions explained and given to family. Family verbalized understanding. All questions answered.

## 2024-01-19 ENCOUNTER — TELEPHONE (OUTPATIENT)
Dept: OBGYN UNIT | Facility: HOSPITAL | Age: 24
End: 2024-01-19

## 2024-01-21 ENCOUNTER — PATIENT MESSAGE (OUTPATIENT)
Dept: OBGYN CLINIC | Facility: CLINIC | Age: 24
End: 2024-01-21

## 2024-01-22 ENCOUNTER — POSTPARTUM (OUTPATIENT)
Dept: OBGYN CLINIC | Facility: CLINIC | Age: 24
End: 2024-01-22
Payer: COMMERCIAL

## 2024-01-22 ENCOUNTER — TELEPHONE (OUTPATIENT)
Dept: OBGYN CLINIC | Facility: CLINIC | Age: 24
End: 2024-01-22

## 2024-01-22 VITALS
WEIGHT: 142 LBS | DIASTOLIC BLOOD PRESSURE: 60 MMHG | SYSTOLIC BLOOD PRESSURE: 108 MMHG | BODY MASS INDEX: 24 KG/M2 | HEART RATE: 61 BPM

## 2024-01-22 PROCEDURE — 3078F DIAST BP <80 MM HG: CPT | Performed by: STUDENT IN AN ORGANIZED HEALTH CARE EDUCATION/TRAINING PROGRAM

## 2024-01-22 PROCEDURE — 3074F SYST BP LT 130 MM HG: CPT | Performed by: STUDENT IN AN ORGANIZED HEALTH CARE EDUCATION/TRAINING PROGRAM

## 2024-01-22 RX ORDER — CEPHALEXIN 500 MG/1
CAPSULE ORAL
COMMUNITY
Start: 2024-01-21

## 2024-01-22 RX ORDER — METHYLERGONOVINE MALEATE 0.2 MG/1
0.2 TABLET ORAL 3 TIMES DAILY
COMMUNITY
Start: 2024-01-21 | End: 2024-01-23

## 2024-01-23 NOTE — PROGRESS NOTES
[facility]  Obstetrics and Gynecology   Postpartum Progress Note    Subjective:     Aisha Azevedo is a 23 year old  female who is s/p  . She presented to the ED  with heavy vaginal bleeding, passing large clots and saturating pads within minutes.     In the ED, ultrasound was performed. Hg was stable. She was discharged with Methergine x 48 hours.     Today she tells me her cramping and bleeding have gotten much better. Bleeding is now a normal amount.     She feels lightheaded at times. Denies fevers or foul smelling discharge.    Review of Systems:  General:  denies fevers, chills, fatigue and malaise.   Respiratory:  denies SOB, dyspnea, cough or wheezing  Cardiovascular:  denies chest pain, palpitations, exercise intolerance   GI: denies abdominal pain, diarrhea, constipation  :  denies dysuria, hematuria, increased urinary frequency.  denies abnormal uterine bleeding or vaginal discharge.       Objective:     Vitals:    24 1340   BP: 108/60   Pulse: 61   Weight: 142 lb (64.4 kg)         Body mass index is 23.63 kg/m².    General: AAO.NAD.   CVS exam: normal peripheral perfusion  Chest: non-labored breathing, no tachypnea   Abdominal exam: soft, nontender, nondistended, fundus 4-5 cm below umbilicus  Pelvic exam:   VULVA: normal appearing vulva with no masses, tenderness or lesions  PERINEUM: intact, well healed, no signs of infection.   VAGINA: normal appearing vagina with normal color and discharge, no lesions  CERVIX: normal appearing cervix. Small amount of bleeding vagina. No active bleeding.     Labs:         Assessment:     Aisha Azevedo is a 23 year old  female who presents for ED f/u with delayed postpartum hemorrhage.  Patient Active Problem List   Diagnosis    History of  delivery    Hx successful  (vaginal birth after ), currently pregnant    History of cholestasis during pregnancy    Supervision of normal pregnancy    Pregnancy     Encounter for induction of labor    , delivered         Plan:     PPH  -discussed possible etiologies  -exam normal today  -continue methergine x48 hours total  -continue to monitor bleeding     All of the findings and plan were discussed with the patient.  She notes understanding and agrees with the plan of care.  All questions were answered to the best of my ability at this time.    RTC for 6 wk check up     Dusty Hutchison MD        Note to patient and family   The  [employer] Cures Act makes medical notes available to patients in the interest of transparency.  However, please be advised that this is a medical document.  It is intended as [id number] communication.  It is written and medical language may contain abbreviations or verbiage that are technical and unfamiliar.  It may appear blunt or direct.  Medical documents are intended to carry relevant information, facts as evident, and the clinical opinion of the practitioner.

## 2024-01-26 NOTE — TELEPHONE ENCOUNTER
From: Aisha Azevedo  To: Tuyet Lima  Sent: 1/21/2024 10:30 PM CST  Subject: Urgent appointment needed today    Hello Team,    I had to go to ER last night due to heavy bleeding.     As recommended by ER providers, I should follow up with my OB/Gyn on Monday.     Please let me know when and what location I can see one of the available doctors.     Give me a call for any further questions regarding this.     Thanks,  Aisha  [phone]

## 2024-02-27 ENCOUNTER — POSTPARTUM (OUTPATIENT)
Dept: OBGYN CLINIC | Facility: CLINIC | Age: 24
End: 2024-02-27
Payer: COMMERCIAL

## 2024-02-27 VITALS
WEIGHT: 139.38 LBS | BODY MASS INDEX: 23.22 KG/M2 | HEART RATE: 69 BPM | SYSTOLIC BLOOD PRESSURE: 108 MMHG | DIASTOLIC BLOOD PRESSURE: 60 MMHG | HEIGHT: 65 IN

## 2024-02-27 PROCEDURE — 3078F DIAST BP <80 MM HG: CPT | Performed by: OBSTETRICS & GYNECOLOGY

## 2024-02-27 PROCEDURE — 3074F SYST BP LT 130 MM HG: CPT | Performed by: OBSTETRICS & GYNECOLOGY

## 2024-02-27 PROCEDURE — 3008F BODY MASS INDEX DOCD: CPT | Performed by: OBSTETRICS & GYNECOLOGY

## 2024-02-27 NOTE — PROGRESS NOTES
POSTPARTUM VISIT    S: patient is a 23 year old  who presents today for post partum visit. She underwent  on 24.     Patient Active Problem List   Diagnosis    History of  delivery    Hx successful  (vaginal birth after ), currently pregnant ([facility])    History of cholestasis during pregnancy    Supervision of normal pregnancy ([facility])    Pregnancy ([facility])    Encounter for induction of labor ([facility])    , delivered ([facility])    Delayed postpartum hemorrhage ([facility])        She is doing well, bonding with baby girl. Bleeding has significantly improved.  Mood: good. Denies SI/HI.  Depression Scale Total: 0 (2024  4:03 PM)      Review of Systems   Constitutional: Negative for activity change, appetite change, chills, fever and unexpected weight change.   HENT: Negative for congestion.    Respiratory: Negative for shortness of breath and wheezing.    Cardiovascular: Negative for chest pain.   Breast: denies pain or skin changes  Gastrointestinal: Negative for vomiting, abdominal pain and abdominal distention.   Genitourinary: Negative for dysuria, frequency, hematuria, vaginal discharge, difficulty urinating, genital sores, vaginal pain, pelvic pain.   Skin: Negative for wound.   Neurological: Negative for dizziness, seizures, numbness and headaches.   Psychiatric/Behavioral: Negative for suicidal ideas.       O:   Vitals:    24 1602   BP: 108/60   Pulse: 69   Weight: 139 lb 6 oz (63.2 kg)   Height: 65\"       Body mass index is 23.19 kg/m².     General:  NAD, AAOx3   Abdomen: soft, nontender, nondistended.   Ext: nontender, no edema  GYNE/: deferred              A/P:  -Postpartum depression screen reassuring  -Contraception: undecided, counseled regarding options, provided with educational information  -Pap smear: neg     Follow up with annual exam or sooner prn.    Tuyet Potts MD  [facility]  2024 4:09 PM

## 2024-03-02 PROBLEM — Z34.90 ENCOUNTER FOR INDUCTION OF LABOR (HCC): Status: RESOLVED | Noted: 2024-01-16 | Resolved: 2024-03-02

## 2024-03-02 PROBLEM — O34.219 VBAC, DELIVERED (HCC): Status: RESOLVED | Noted: 2024-01-16 | Resolved: 2024-03-02

## 2024-03-02 PROBLEM — Z87.59 HISTORY OF POSTPARTUM HEMORRHAGE: Status: ACTIVE | Noted: 2024-01-23

## 2024-03-02 PROBLEM — Z34.90 SUPERVISION OF NORMAL PREGNANCY (HCC): Status: RESOLVED | Noted: 2023-09-12 | Resolved: 2024-03-02

## 2024-03-02 PROBLEM — Z34.90 PREGNANCY (HCC): Status: RESOLVED | Noted: 2024-01-16 | Resolved: 2024-03-02

## 2024-06-27 ENCOUNTER — OFFICE VISIT (OUTPATIENT)
Dept: OBGYN CLINIC | Facility: CLINIC | Age: 24
End: 2024-06-27

## 2024-06-27 VITALS
DIASTOLIC BLOOD PRESSURE: 62 MMHG | SYSTOLIC BLOOD PRESSURE: 100 MMHG | BODY MASS INDEX: 23 KG/M2 | WEIGHT: 139 LBS | HEART RATE: 70 BPM

## 2024-06-27 DIAGNOSIS — N93.9 ABNORMAL UTERINE BLEEDING (AUB): ICD-10-CM

## 2024-06-27 DIAGNOSIS — R10.2 PELVIC PAIN: Primary | ICD-10-CM

## 2024-06-27 PROCEDURE — 3078F DIAST BP <80 MM HG: CPT | Performed by: STUDENT IN AN ORGANIZED HEALTH CARE EDUCATION/TRAINING PROGRAM

## 2024-06-27 PROCEDURE — 3074F SYST BP LT 130 MM HG: CPT | Performed by: STUDENT IN AN ORGANIZED HEALTH CARE EDUCATION/TRAINING PROGRAM

## 2024-06-27 PROCEDURE — 99213 OFFICE O/P EST LOW 20 MIN: CPT | Performed by: STUDENT IN AN ORGANIZED HEALTH CARE EDUCATION/TRAINING PROGRAM

## 2024-06-27 NOTE — PROGRESS NOTES
GYN PROBLEM VISIT    Subjective:   This is a 23 year old  presenting for GYN visit.     Has been having several months of left sided pain along her  incision. Pain comes and goes. It is sharp. Pain is aggravated with movement. Does not radiate. Reports intermittent back pain as well.     Has been having periods every 14 days. Periods are not heavy lasting only a few days. Previously had regular cycles. Also reports vaginal irritation/burning. Is breastfeeding her 5 month old.     Review of Systems   Constitutional: Negative.    HENT: Negative.    Respiratory: Negative.    Gastrointestinal: Negative.    Endocrine: Negative.    Genitourinary: as above    Musculoskeletal: Negative.    Skin: Negative.    Allergic/Immunologic: Negative.    Neurological: Negative.      Past Medical History:    History of cholestasis during pregnancy    History of     History of     IOL    , delivered ([facility])       Past Surgical History:   Procedure Laterality Date     delivery+postpartum care  10/2018    Cholecystectomy       class  2022       No Known Allergies     Prenatal Multivit-Min-Fe-FA (PRENATAL VITAMINS OR) Take by mouth.           Objective:    Physical Exam     Vitals:    24 1423   BP: 100/62   Pulse: 70        Constitutional: She is oriented to person, place, and time. She appears well-developed and well-nourished.   Abdominal: Soft. +tenderness at the corner of her  scar on the left. No masses felt.   Genitourinary: Normal appearing external genitalia. Vagina is well estrogenized. Normal appearing urethral meatus. Bartholin's gland normal to palpation. Normal appearing cervix. Cervix is not friable and with normal appearing discharge. Left pelvic floor muscles are more tense than right   Neurological: She is alert and oriented to person, place, and time.     Assessment/Plan:  This is a 23 year old  presenting with    #pain at left corner of incision-associated  with movement   -discussed possible scar tissue. This in addition to left pelvic floor muscle being more tense, offered PT.   -pelvic ultrasound    #frequent menstruation: discussed that it may be anovulatory bleeding as a result of breastfeeding. Recommend she continue to monitor.   -CBC, TSH    #vaginal irritation/burning  -likely related to breastfeeding and hypoestrogenism    Dusty Hutchison MD

## 2024-06-27 NOTE — PATIENT INSTRUCTIONS
Your physician has recommended you to [facility].  If your insurance requires you to obtain a managed care referral, please allow 3 working days from the date of this order for the referral to be processed.  Please wait 3 working days to schedule your appointment unless notified.      [facility] in [address] [address]               [phone]

## 2024-07-03 ENCOUNTER — PATIENT MESSAGE (OUTPATIENT)
Dept: OBGYN CLINIC | Facility: CLINIC | Age: 24
End: 2024-07-03

## 2024-07-05 NOTE — TELEPHONE ENCOUNTER
From: Aisha Azevedo  To: Dusty Hutchison  Sent: 7/3/2024 11:46 PM CDT  Subject: Birth control pills     Hello doctor,    As discussed can you please order the contraceptive pills for me to regular my period.     Was anyways planning to start taking these as birth control.     Thanks,  Aisha

## 2024-07-06 RX ORDER — NORETHINDRONE ACETATE AND ETHINYL ESTRADIOL 1.5-30(21)
1 KIT ORAL DAILY
Qty: 28 TABLET | Refills: 13 | Status: SHIPPED | OUTPATIENT
Start: 2024-07-06 | End: 2025-07-06

## 2024-07-17 LAB
ABSOLUTE BASOPHILS: 32 CELLS/UL (ref 0–200)
ABSOLUTE EOSINOPHILS: 90 CELLS/UL (ref 15–500)
ABSOLUTE LYMPHOCYTES: 1850 CELLS/UL (ref 850–3900)
ABSOLUTE MONOCYTES: 398 CELLS/UL (ref 200–950)
ABSOLUTE NEUTROPHILS: 2931 CELLS/UL (ref 1500–7800)
BASOPHILS: 0.6 %
EOSINOPHILS: 1.7 %
HEMATOCRIT: 37.1 % (ref 35–45)
HEMOGLOBIN: 11.5 G/DL (ref 11.7–15.5)
LYMPHOCYTES: 34.9 %
MCH: 25.6 PG (ref 27–33)
MCHC: 31 G/DL (ref 32–36)
MCV: 82.6 FL (ref 80–100)
MONOCYTES: 7.5 %
MPV: 13.1 FL (ref 7.5–12.5)
NEUTROPHILS: 55.3 %
PLATELET COUNT: 215 THOUSAND/UL (ref 140–400)
RDW: 14.5 % (ref 11–15)
RED BLOOD CELL COUNT: 4.49 MILLION/UL (ref 3.8–5.1)
TSH W/REFLEX TO FT4: 1.27 MIU/L
WHITE BLOOD CELL COUNT: 5.3 THOUSAND/UL (ref 3.8–10.8)

## 2024-07-30 ENCOUNTER — ULTRASOUND ENCOUNTER (OUTPATIENT)
Dept: OBGYN CLINIC | Facility: CLINIC | Age: 24
End: 2024-07-30
Payer: COMMERCIAL

## 2024-07-30 DIAGNOSIS — R10.2 PELVIC PAIN: Primary | ICD-10-CM

## 2024-07-30 PROCEDURE — 76830 TRANSVAGINAL US NON-OB: CPT | Performed by: STUDENT IN AN ORGANIZED HEALTH CARE EDUCATION/TRAINING PROGRAM

## 2024-08-19 ENCOUNTER — TELEPHONE (OUTPATIENT)
Dept: PHYSICAL THERAPY | Facility: HOSPITAL | Age: 24
End: 2024-08-19

## 2024-09-03 ENCOUNTER — APPOINTMENT (OUTPATIENT)
Dept: PHYSICAL THERAPY | Age: 24
End: 2024-09-03
Attending: STUDENT IN AN ORGANIZED HEALTH CARE EDUCATION/TRAINING PROGRAM
Payer: COMMERCIAL

## 2024-09-10 ENCOUNTER — APPOINTMENT (OUTPATIENT)
Dept: PHYSICAL THERAPY | Age: 24
End: 2024-09-10
Attending: STUDENT IN AN ORGANIZED HEALTH CARE EDUCATION/TRAINING PROGRAM
Payer: COMMERCIAL

## 2024-09-17 ENCOUNTER — APPOINTMENT (OUTPATIENT)
Dept: PHYSICAL THERAPY | Age: 24
End: 2024-09-17
Attending: STUDENT IN AN ORGANIZED HEALTH CARE EDUCATION/TRAINING PROGRAM
Payer: COMMERCIAL

## 2024-09-18 ENCOUNTER — OFFICE VISIT (OUTPATIENT)
Dept: FAMILY MEDICINE CLINIC | Facility: CLINIC | Age: 24
End: 2024-09-18
Payer: COMMERCIAL

## 2024-09-18 VITALS
SYSTOLIC BLOOD PRESSURE: 104 MMHG | OXYGEN SATURATION: 99 % | WEIGHT: 136.19 LBS | HEIGHT: 64 IN | DIASTOLIC BLOOD PRESSURE: 60 MMHG | RESPIRATION RATE: 18 BRPM | TEMPERATURE: 98 F | HEART RATE: 94 BPM | BODY MASS INDEX: 23.25 KG/M2

## 2024-09-18 DIAGNOSIS — Z00.00 WELL ADULT EXAM: Primary | ICD-10-CM

## 2024-09-18 PROBLEM — Z87.59 HISTORY OF POSTPARTUM HEMORRHAGE: Status: RESOLVED | Noted: 2024-01-23 | Resolved: 2024-09-18

## 2024-09-18 PROBLEM — O34.219 HX SUCCESSFUL VBAC (VAGINAL BIRTH AFTER CESAREAN), CURRENTLY PREGNANT (HCC): Status: RESOLVED | Noted: 2023-07-24 | Resolved: 2024-09-18

## 2024-09-18 PROCEDURE — 99385 PREV VISIT NEW AGE 18-39: CPT | Performed by: FAMILY MEDICINE

## 2024-09-18 PROCEDURE — 3078F DIAST BP <80 MM HG: CPT | Performed by: FAMILY MEDICINE

## 2024-09-18 PROCEDURE — 3008F BODY MASS INDEX DOCD: CPT | Performed by: FAMILY MEDICINE

## 2024-09-18 PROCEDURE — 3074F SYST BP LT 130 MM HG: CPT | Performed by: FAMILY MEDICINE

## 2024-09-18 NOTE — PROGRESS NOTES
Chief Complaint   Patient presents with    Physical    Well Adult     Pt is not fasting         HPI  Pt is here for wellness. No complaints PAP UTD and she is feeling well. Has some off and on low back pain since having her baby but it doesn't bother her all the time. Declines PT    ROS  As per HPI and all other systems reviewed and are negative      Past Medical History:    History of cholestasis during pregnancy    History of     History of     IOL    , delivered ([facility])       Past Surgical History:   Procedure Laterality Date     delivery+postpartum care  10/2018    Cholecystectomy       class  2022       Social History     Socioeconomic History    Marital status:    Tobacco Use    Smoking status: Never     Passive exposure: Never    Smokeless tobacco: Never   Vaping Use    Vaping status: Never Used   Substance and Sexual Activity    Alcohol use: Not Currently    Drug use: Not Currently    Sexual activity: Not Currently     Partners: Male   Other Topics Concern    Caffeine Concern Yes    Exercise No    Seat Belt Yes       No family history on file.     Current Outpatient Medications on File Prior to Visit   Medication Sig Dispense Refill    Norethin Ace-Eth Estrad-FE (LOESTRIN FE 1.5/30) 1.5-30 MG-MCG Oral Tab Take 1 tablet by mouth daily. (Patient not taking: Reported on 2024) 28 tablet 13    IRON-FOLIC ACID OR Take by mouth. (Patient not taking: Reported on 2024)      Prenatal Multivit-Min-Fe-FA (PRENATAL VITAMINS OR) Take by mouth. (Patient not taking: Reported on 2024)       No current facility-administered medications on file prior to visit.         Objective  Vitals:    24 0934   BP: 104/60   Pulse: 94   Resp: 18   Temp: 98.2 °F (36.8 °C)   SpO2: 99%   Weight: 136 lb 3.2 oz (61.8 kg)   Height: 5' 4\" (1.626 m)     Physical Exam  Constitutional:       Appearance: Normal appearance.   HEENT:      Head: Normocephalic and atraumatic.      Eyes: PERRLA no  notable nystagmus     Ears: normal on observation     Nose: Nose normal.      Mouth: Mucous membranes are moist.      Neck: no masses no bruit  Cardiovascular:      Rate and Rhythm: Normal rate and regular rhythm.   Pulmonary:      Effort: Pulmonary effort is normal.      Breath sounds: Normal breath sounds.   Abdominal:      General: Bowel sounds are normal.      Palpations: Abdomen is soft. There is no mass.   Musculoskeletal:         General: Normal range of motion.      Cervical back: Normal range of motion.   Skin:     General: Skin is warm and dry.   Neurological:      General: No focal deficit present.      Mental Status: She is alert and oriented to person, place, and time.   Psychiatric:         Mood and Affect: Mood normal.         Thought Content: Thought content normal.       Assessment and Plan  Aisha was seen today for physical and well adult.    Diagnoses and all orders for this visit:    Well adult exam  -     CBC [6399] [Q]; Future  -     COMP METABOLIC PANEL [90811] [Q]; Future  -     LIPID PANEL [7600] [Q]; Future  -     TSH W REFLEX TO FREE T4 [18493][Q]; Future  -     CBC [6399] [Q]  -     COMP METABOLIC PANEL [59690] [Q]  -     LIPID PANEL [7600] [Q]  -     TSH W REFLEX TO FREE T4 [59928][Q]           Follow up  No follow-ups on file.      Patient Instructions  There are no Patient Instructions on file for this visit.       George Mayes MD       This note was created by Dragon voice recognition. Errors in content may be related to improper recognition by the system; efforts to review and correct have been done but errors may still exist. Please be advised the primary purpose of this note is for me to communicate medical care. Standard sentence structure is not always used. Medical terminology and medical abbreviations may be used. There may be grammatical, typographical, and automated fill ins that may have errors missed in proofreading.

## 2024-09-26 ENCOUNTER — APPOINTMENT (OUTPATIENT)
Dept: PHYSICAL THERAPY | Age: 24
End: 2024-09-26
Attending: STUDENT IN AN ORGANIZED HEALTH CARE EDUCATION/TRAINING PROGRAM
Payer: COMMERCIAL

## 2024-09-26 DIAGNOSIS — R79.89 ABNORMAL CBC: ICD-10-CM

## 2024-09-26 DIAGNOSIS — D50.8 OTHER IRON DEFICIENCY ANEMIA: Primary | ICD-10-CM

## 2024-09-26 LAB
ABSOLUTE BASOPHILS: 23 CELLS/UL (ref 0–200)
ABSOLUTE EOSINOPHILS: 120 CELLS/UL (ref 15–500)
ABSOLUTE LYMPHOCYTES: 2063 CELLS/UL (ref 850–3900)
ABSOLUTE MONOCYTES: 458 CELLS/UL (ref 200–950)
ABSOLUTE NEUTROPHILS: 4838 CELLS/UL (ref 1500–7800)
ALBUMIN/GLOBULIN RATIO: 1.5 (CALC) (ref 1–2.5)
ALBUMIN: 4.6 G/DL (ref 3.6–5.1)
ALKALINE PHOSPHATASE: 97 U/L (ref 31–125)
ALT: 28 U/L (ref 6–29)
AST: 28 U/L (ref 10–30)
BASOPHILS: 0.3 %
BILIRUBIN, TOTAL: 0.4 MG/DL (ref 0.2–1.2)
BUN: 17 MG/DL (ref 7–25)
CALCIUM: 9.1 MG/DL (ref 8.6–10.2)
CARBON DIOXIDE: 25 MMOL/L (ref 20–32)
CHLORIDE: 106 MMOL/L (ref 98–110)
CHOL/HDLC RATIO: 3 (CALC)
CHOLESTEROL, TOTAL: 146 MG/DL
CREATININE: 0.67 MG/DL (ref 0.5–0.96)
EGFR: 124 ML/MIN/1.73M2
EOSINOPHILS: 1.6 %
GLOBULIN: 3.1 G/DL (CALC) (ref 1.9–3.7)
GLUCOSE: 85 MG/DL (ref 65–99)
HDL CHOLESTEROL: 49 MG/DL
HEMATOCRIT: 40.6 % (ref 35–45)
HEMOGLOBIN: 12.4 G/DL (ref 11.7–15.5)
LDL-CHOLESTEROL: 84 MG/DL (CALC)
LYMPHOCYTES: 27.5 %
MCH: 26.1 PG (ref 27–33)
MCHC: 30.5 G/DL (ref 32–36)
MCV: 85.3 FL (ref 80–100)
MONOCYTES: 6.1 %
MPV: 11.9 FL (ref 7.5–12.5)
NEUTROPHILS: 64.5 %
NON-HDL CHOLESTEROL: 97 MG/DL (CALC)
PLATELET COUNT: 324 THOUSAND/UL (ref 140–400)
POTASSIUM: 4.4 MMOL/L (ref 3.5–5.3)
PROTEIN, TOTAL: 7.7 G/DL (ref 6.1–8.1)
RDW: 13.6 % (ref 11–15)
RED BLOOD CELL COUNT: 4.76 MILLION/UL (ref 3.8–5.1)
SODIUM: 139 MMOL/L (ref 135–146)
TRIGLYCERIDES: 45 MG/DL
TSH W/REFLEX TO FT4: 1.65 MIU/L
WHITE BLOOD CELL COUNT: 7.5 THOUSAND/UL (ref 3.8–10.8)

## 2024-10-01 ENCOUNTER — APPOINTMENT (OUTPATIENT)
Dept: PHYSICAL THERAPY | Age: 24
End: 2024-10-01
Attending: STUDENT IN AN ORGANIZED HEALTH CARE EDUCATION/TRAINING PROGRAM
Payer: COMMERCIAL

## 2024-10-08 ENCOUNTER — APPOINTMENT (OUTPATIENT)
Dept: PHYSICAL THERAPY | Age: 24
End: 2024-10-08
Attending: STUDENT IN AN ORGANIZED HEALTH CARE EDUCATION/TRAINING PROGRAM
Payer: COMMERCIAL

## 2024-10-10 ENCOUNTER — APPOINTMENT (OUTPATIENT)
Dept: PHYSICAL THERAPY | Age: 24
End: 2024-10-10
Attending: STUDENT IN AN ORGANIZED HEALTH CARE EDUCATION/TRAINING PROGRAM
Payer: COMMERCIAL

## 2024-10-15 ENCOUNTER — APPOINTMENT (OUTPATIENT)
Dept: PHYSICAL THERAPY | Age: 24
End: 2024-10-15
Attending: STUDENT IN AN ORGANIZED HEALTH CARE EDUCATION/TRAINING PROGRAM
Payer: COMMERCIAL

## 2024-10-22 ENCOUNTER — APPOINTMENT (OUTPATIENT)
Dept: PHYSICAL THERAPY | Age: 24
End: 2024-10-22
Attending: STUDENT IN AN ORGANIZED HEALTH CARE EDUCATION/TRAINING PROGRAM
Payer: COMMERCIAL

## 2024-10-29 ENCOUNTER — APPOINTMENT (OUTPATIENT)
Dept: PHYSICAL THERAPY | Age: 24
End: 2024-10-29
Attending: STUDENT IN AN ORGANIZED HEALTH CARE EDUCATION/TRAINING PROGRAM
Payer: COMMERCIAL

## 2024-11-05 ENCOUNTER — APPOINTMENT (OUTPATIENT)
Dept: PHYSICAL THERAPY | Age: 24
End: 2024-11-05
Attending: STUDENT IN AN ORGANIZED HEALTH CARE EDUCATION/TRAINING PROGRAM

## 2024-11-20 ENCOUNTER — TELEPHONE (OUTPATIENT)
Dept: FAMILY MEDICINE CLINIC | Facility: CLINIC | Age: 24
End: 2024-11-20

## 2024-11-20 NOTE — TELEPHONE ENCOUNTER
Letter mailed to patient reminding her she has outstanding orders to go to [facility].     Orders enclosed with letter.  Order Code Tests Ordered (Total: 1)    Order Code Tests Ordered      6399 CBC [6399] [Q]

## 2024-11-23 ENCOUNTER — APPOINTMENT (OUTPATIENT)
Dept: GENERAL RADIOLOGY | Age: 24
End: 2024-11-23
Attending: PHYSICIAN ASSISTANT
Payer: COMMERCIAL

## 2024-11-23 ENCOUNTER — HOSPITAL ENCOUNTER (OUTPATIENT)
Age: 24
Discharge: HOME | End: 2024-11-23
Payer: COMMERCIAL

## 2024-11-23 VITALS
DIASTOLIC BLOOD PRESSURE: 62 MMHG | OXYGEN SATURATION: 99 % | SYSTOLIC BLOOD PRESSURE: 109 MMHG | HEART RATE: 76 BPM | TEMPERATURE: 98 F | RESPIRATION RATE: 16 BRPM

## 2024-11-23 DIAGNOSIS — M25.572 ACUTE LEFT ANKLE PAIN: Primary | ICD-10-CM

## 2024-11-23 PROCEDURE — 99204 OFFICE O/P NEW MOD 45 MIN: CPT | Performed by: PHYSICIAN ASSISTANT

## 2024-11-23 PROCEDURE — 73610 X-RAY EXAM OF ANKLE: CPT | Performed by: PHYSICIAN ASSISTANT

## 2024-11-23 NOTE — DISCHARGE INSTRUCTIONS
Take ibuprofen or tylenol for pain.    Follow up with your PCP if symptoms persist.    Apply ice and elevate your leg to help with swelling.

## 2024-11-23 NOTE — ED INITIAL ASSESSMENT (HPI)
Pt with c/o left ankle pain and swelling from twisting her ankle going down the stairs yesterday

## 2024-11-23 NOTE — ED PROVIDER NOTES
Patient Seen in: [facility]      History     Chief Complaint   Patient presents with    Leg or Foot Injury     Twisted my left ankle last night while coming downstairs.Pain and swelling is - Entered by patient     Stated Complaint: left Leg or Foot Injury - Twisted my left ankle last night while coming downsta*    Subjective:   HPI      24-year-old female presents to the IC for evaluation of left ankle pain since yesterday.  Patient reports twisting ankle while walking downstairs yesterday.  Ambulatory with a slow but steady gait.  No numbness or weakness.    Objective:     Past Medical History:    History of cholestasis during pregnancy    History of     History of     IOL    , delivered ([facility])              Past Surgical History:   Procedure Laterality Date     delivery+postpartum care  10/2018    Cholecystectomy       class  2022                Social History     Socioeconomic History    Marital status:    Tobacco Use    Smoking status: Never     Passive exposure: Never    Smokeless tobacco: Never   Vaping Use    Vaping status: Never Used   Substance and Sexual Activity    Alcohol use: Not Currently    Drug use: Not Currently    Sexual activity: Not Currently     Partners: Male   Other Topics Concern    Caffeine Concern Yes    Exercise No    Seat Belt Yes     Social Drivers of Health     Financial Resource Strain: Low Risk  (2024)    Financial Resource Strain     Difficulty of Paying Living Expenses: Not hard at all     Med Affordability: No   Food Insecurity: No Food Insecurity (2024)    Food Insecurity     Food Insecurity: Never true   Transportation Needs: No Transportation Needs (2024)    Transportation Needs     Lack of Transportation: No   Stress: No Stress Concern Present (2024)    Stress     Feeling of Stress : No    [facility]    Housing Stability              Review of  Systems    Positive for stated complaint: left Leg or Foot Injury - Twisted my left ankle last night while coming downsta*  Other systems are as noted in HPI.  Constitutional and vital signs reviewed.      All other systems reviewed and negative except as noted above.    Physical Exam     ED Triage Vitals [11/23/24 1029]   /62   Pulse 76   Resp 16   Temp 98.4 °F (36.9 °C)   Temp src Temporal   SpO2 99 %   O2 Device None (Room air)       Current Vitals:   Vital Signs  BP: 109/62  Pulse: 76  Resp: 16  Temp: 98.4 °F (36.9 °C)  Temp src: Temporal    Oxygen Therapy  SpO2: 99 %  O2 Device: None (Room air)        Physical Exam  Vitals and nursing note reviewed.   Constitutional:       Appearance: She is well-developed.   HENT:      Head: Atraumatic.      Right Ear: External ear normal.      Left Ear: External ear normal.   Eyes:      Conjunctiva/sclera: Conjunctivae normal.   Cardiovascular:      Rate and Rhythm: Normal rate.   Pulmonary:      Effort: Pulmonary effort is normal.   Musculoskeletal:      Cervical back: Normal range of motion and neck supple.      Left ankle: Swelling present. No deformity or ecchymosis. Tenderness present over the ATF ligament and AITF ligament. No lateral malleolus, medial malleolus, base of 5th metatarsal or proximal fibula tenderness. Normal range of motion. Normal pulse.      Left Achilles Tendon: Normal.   Skin:     General: Skin is warm and dry.      Capillary Refill: Capillary refill takes less than 2 seconds.   Neurological:      Mental Status: She is alert.   Psychiatric:         Mood and Affect: Mood normal.             ED Course   Labs Reviewed - No data to display         XR ANKLE (MIN 3 VIEWS), LEFT (CPT=73610)    Result Date: 11/23/2024            PROCEDURE:  XR ANKLE (MIN 3 VIEWS), LEFT (CPT=73610)  TECHNIQUE:  Three views were obtained.  COMPARISON:  None.  INDICATIONS:  left Leg or Foot Injury - Twisted my left ankle last night while coming downstairs.Pain and swelling  is  PATIENT STATED HISTORY: (As transcribed by Technologist)  Patient missed step and tripped. Left ankle pain, difficult to walk.    Findings:  No fracture or dislocation.  Joint spaces are well maintained.  Soft tissue swelling over the lateral malleolus.  IMPRESSION:  Unremarkable radiographs of the left ankle.   LOCATION:  [facility]   Dictated by (CST): Vince Jarvis MD on 11/23/2024 at 11:09 AM     Finalized by (CST): Vince Jarvis MD on 11/23/2024 at 11:09 AM             MDM        14-year-old female presents to the IC with left ankle pain after twisting injury yesterday.    Differential diagnosis reflecting the complexity of care include: fracture, dislocation, contusion    History obtained by an independent source was from: patient and     I have independently visualized the radiology images, my focus/limited interpretation: no fracture noted on Xray  Defer to radiologist for other/incidental findings         X-ray negative for acute fracture.  Patient informed of results. Discussed conservative treatment with rest, ice, compression and elevation method. Ibuprofen or tylenol as needed for pain. All questions answered.    Medical Decision Making      Disposition and Plan     Clinical Impression:  1. Acute left ankle pain         Disposition:  Discharge  11/23/2024 11:17 am    Follow-up:  George Mayes MD  [address] [facility] [id number]  [phone]                Medications Prescribed:  Discharge Medication List as of 11/23/2024 11:20 AM              Supplementary Documentation:

## 2025-07-23 ENCOUNTER — OFFICE VISIT (OUTPATIENT)
Dept: FAMILY MEDICINE CLINIC | Facility: CLINIC | Age: 25
End: 2025-07-23
Payer: COMMERCIAL

## 2025-07-23 VITALS
RESPIRATION RATE: 18 BRPM | HEART RATE: 61 BPM | BODY MASS INDEX: 23.13 KG/M2 | WEIGHT: 133.81 LBS | SYSTOLIC BLOOD PRESSURE: 102 MMHG | DIASTOLIC BLOOD PRESSURE: 64 MMHG | OXYGEN SATURATION: 98 % | HEIGHT: 63.78 IN | TEMPERATURE: 98 F

## 2025-07-23 DIAGNOSIS — R42 POSTURAL DIZZINESS: ICD-10-CM

## 2025-07-23 DIAGNOSIS — Z00.00 WELL ADULT EXAM: Primary | ICD-10-CM

## 2025-07-23 DIAGNOSIS — D50.9 IRON DEFICIENCY ANEMIA, UNSPECIFIED IRON DEFICIENCY ANEMIA TYPE: ICD-10-CM

## 2025-07-23 PROBLEM — O09.899 MATERNAL VARICELLA, NON-IMMUNE (HCC): Status: RESOLVED | Noted: 2018-03-20 | Resolved: 2025-07-23

## 2025-07-23 PROBLEM — N93.9 VAGINAL BLEEDING: Status: RESOLVED | Noted: 2019-02-11 | Resolved: 2025-07-23

## 2025-07-23 PROBLEM — D64.9 ANEMIA: Status: ACTIVE | Noted: 2020-01-17

## 2025-07-23 PROBLEM — O99.019 ANEMIA AFFECTING PREGNANCY (HCC): Status: RESOLVED | Noted: 2022-03-29 | Resolved: 2025-07-23

## 2025-07-23 PROBLEM — O26.649 CHOLESTASIS DURING PREGNANCY (HCC): Status: RESOLVED | Noted: 2022-05-13 | Resolved: 2025-07-23

## 2025-07-23 PROBLEM — Z28.39 MATERNAL VARICELLA, NON-IMMUNE (HCC): Status: RESOLVED | Noted: 2018-03-20 | Resolved: 2025-07-23

## 2025-07-23 NOTE — PROGRESS NOTES
The following individual(s) verbally consented to be recorded using ambient AI listening technology and understand that they can each withdraw their consent to this listening technology at any point by asking the clinician to turn off or pause the recording:    Patient name: Aisha Azevedo  Additional names:  none        No chief complaint on file.      History of Present Illness  Aisha Azevedo is a 24 year old female who presents with dizziness and constipation. She is accompanied by a family member who provides additional information about her symptoms.    She experiences dizziness occasionally, particularly when standing or walking in the kitchen. This dizziness is infrequent but occurs sporadically. She attributes it to dehydration and possibly anemia, as she does not drink much water. No headaches, nausea, or urination problems are reported.    She experiences constipation sometimes and acknowledges not drinking enough water. She experiences diarrhea after periods of constipation, which may be related to her diet and previous gallbladder removal. She denies any pain associated with these episodes.    She occasionally wakes up at night with stiffness in one hand, making it difficult to lift. This occurs when she sleeps in a certain position.    She has a history of gallbladder removal and experiences digestive issues, particularly diarrhea after consuming spicy or oily foods. She denies snoring and is not currently breastfeeding or planning another pregnancy soon.    She does not drink much water and covers herself, avoiding sun exposure, which may affect her vitamin D levels.      Past Medical History[1]    Past Surgical History[2]    Social Hx on file[3]    Family History[4]     Medications Ordered Prior to Encounter[5]      Objective  Vitals:    07/23/25 1106   BP: 102/64   Pulse: 61   Resp: 18   Temp: 98.2 °F (36.8 °C)   TempSrc: Temporal   SpO2: 98%   Weight: 133 lb 12.8 oz (60.7 kg)   Height: 5' 3.78\"  (1.62 m)         Body mass index is 23.13 kg/m².    Physical Exam  Constitutional:       Appearance: Normal appearance.   HEENT:      Head: Normocephalic and atraumatic.      Eyes: PERRLA no notable nystagmus     Ears: normal on observation     Nose: Nose normal.      Mouth: Mucous membranes are moist.      Neck: no masses no bruit  Cardiovascular:      Rate and Rhythm: Normal rate and regular rhythm.   Pulmonary:      Effort: Pulmonary effort is normal.      Breath sounds: Normal breath sounds.   Abdominal:      General: Bowel sounds are normal.      Palpations: Abdomen is soft. There is no mass.   Musculoskeletal:         General: Normal range of motion.      Cervical back: Normal range of motion.   Skin:     General: Skin is warm and dry.   Neurological:      General: No focal deficit present.      Mental Status: She is alert and oriented to person, place, and time.   Psychiatric:         Mood and Affect: Mood normal.         Thought Content: Thought content normal.       Assessment and Plan  Assessment & Plan  Anemia  Likely iron deficiency anemia contributing to dizziness and hypotension. Dehydration may exacerbate symptoms.  - Order blood work to assess anemia status.  - Encourage increased hydration.    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 Further evaluation depends on anemia status.  - Order echocardiogram if anemia is ruled out as a cause of symptoms.    Intermittent hand stiffness  Stiffness in one hand upon waking, likely due to nerve compression during sleep, causing difficulty in hand movement.  - Advise placing hand under pillow while sleeping.  - Consider wrist splint if symptoms persist.    Post-cholecystectomy diarrhea  Diarrhea after consuming oily or fatty foods, likely related to previous cholecystectomy. Symptoms may be exacerbated by dietary choices.  -  Recommend Benefiber to help regulate bowel movements.  - Advise monitoring dietary intake, particularly fatty foods.  - Consider referral to gastroenterologist if symptoms persist after dietary adjustments.    Constipation  Occasional constipation likely due to insufficient hydration and dietary fiber.  - Advise drinking at least four bottles of water daily.  - Recommend Benefiber, one teaspoon in coffee or tea in the morning.    General Health Maintenance  Emphasized importance of hydration, dietary fiber, and vitamin supplementation, particularly vitamin D and calcium due to limited sun exposure. She declined HPV vaccination.  - Encourage vitamin D and calcium intake through diet (yogurt, cheese, milk).    Follow-up  Follow-up based on blood work results and potential further cardiac evaluation.  - Schedule follow-up appointment after blood work results are available.  - Proceed with cardiac evaluation if anemia is not the cause of symptoms.      ICD-10-CM    1. Well adult exam  Z00.00 CBC With Differential With Platelet     Comp Metabolic Panel (14)     TSH and Free T4     Lipid Panel     CBC With Differential With Platelet     Comp Metabolic Panel (14)     TSH and Free T4     Lipid Panel      2. Iron deficiency anemia, unspecified iron deficiency anemia type  D50.9 Iron And Tibc     Ferritin     Iron And Tibc     Ferritin      3. Postural dizziness  R42 Iron And Tibc     Ferritin     Iron And Tibc     Ferritin                Follow up  No follow-ups on file.      Patient Instructions  Patient Instructions   Benfiber every morning and 4 bottles of water daily    Vit D 1000IU daily            George Mayes MD       This note was created by Dragon voice recognition and or [employer] transcription service. Errors in content may be related to improper recognition by the system; efforts to review and correct have been done but errors may still exist. Please be advised the primary purpose of this note is for me  to communicate medical care. Standard sentence structure is not always used. Medical terminology and medical abbreviations may be used. There may be grammatical, typographical, and automated fill ins that may have errors missed in proofreading.        [1]   Past Medical History:   Anemia affecting pregnancy (HCC)    Hgb 10.9 at 28w, start Fe  [ x]recheck at 33w: 10.9--> increase to twice daily  [x ] recheck at 36-37w: 11.2      Cholestasis during pregnancy (HCC)    Presumptive cholestasis Dx at 35w  LFTs 300/500, bile acids pending  Started UDCA 300mgTID, can titrate up based on Sx (up to 21mg/kg/day)  [ ] Repeat bile acids, even up to weekly until delivery   [ ] NSTs weekly      IOL set for - with Paramjit/Valassis  BILE ACIDS 87     Will need to move up if >100  Delivery at 36w for bile acids > 100  MFM recommends shooting for 37w   36-37w for 40-99  3    History of cholestasis during pregnancy    History of     History of     IOL    Maternal varicella, non-immune (HCC)    Vaginal bleeding    , delivered (HCC)   [2]   Past Surgical History:  Procedure Laterality Date     delivery+postpartum care  10/2018    Cholecystectomy       class  2022   [3]   Social History  Socioeconomic History    Marital status:    Tobacco Use    Smoking status: Never     Passive exposure: Never    Smokeless tobacco: Never   Vaping Use    Vaping status: Never Used   Substance and Sexual Activity    Alcohol use: Not Currently    Drug use: Not Currently    Sexual activity: Not Currently     Partners: Male   Other Topics Concern    Caffeine Concern Yes    Exercise No    Seat Belt Yes   [4] No family history on file.  [5]   No current outpatient medications on file prior to visit.     No current facility-administered medications on file prior to visit.

## 2025-07-25 LAB
% SATURATION: 39 % (CALC) (ref 16–45)
ABSOLUTE BASOPHILS: 42 CELLS/UL (ref 0–200)
ABSOLUTE EOSINOPHILS: 180 CELLS/UL (ref 15–500)
ABSOLUTE LYMPHOCYTES: 2328 CELLS/UL (ref 850–3900)
ABSOLUTE MONOCYTES: 480 CELLS/UL (ref 200–950)
ABSOLUTE NEUTROPHILS: 2970 CELLS/UL (ref 1500–7800)
ALBUMIN/GLOBULIN RATIO: 1.8 (CALC) (ref 1–2.5)
ALBUMIN: 4.8 G/DL (ref 3.6–5.1)
ALKALINE PHOSPHATASE: 56 U/L (ref 31–125)
ALT: 24 U/L (ref 6–29)
AST: 28 U/L (ref 10–30)
BASOPHILS: 0.7 %
BILIRUBIN, TOTAL: 0.5 MG/DL (ref 0.2–1.2)
BUN: 13 MG/DL (ref 7–25)
CALCIUM: 9 MG/DL (ref 8.6–10.2)
CARBON DIOXIDE: 24 MMOL/L (ref 20–32)
CHLORIDE: 104 MMOL/L (ref 98–110)
CHOL/HDLC RATIO: 2.9 (CALC)
CHOLESTEROL, TOTAL: 134 MG/DL
CREATININE: 0.68 MG/DL (ref 0.5–0.96)
EGFR: 123 ML/MIN/1.73M2
EOSINOPHILS: 3 %
FERRITIN: 17 NG/ML (ref 16–154)
GLOBULIN: 2.7 G/DL (CALC) (ref 1.9–3.7)
GLUCOSE: 83 MG/DL (ref 65–99)
HDL CHOLESTEROL: 47 MG/DL
HEMATOCRIT: 38.5 % (ref 35–45)
HEMOGLOBIN: 11.6 G/DL (ref 11.7–15.5)
IRON BINDING CAPACITY: 360 MCG/DL (CALC) (ref 250–450)
IRON, TOTAL: 141 MCG/DL (ref 40–190)
LDL-CHOLESTEROL: 73 MG/DL (CALC)
LYMPHOCYTES: 38.8 %
MCH: 26 PG (ref 27–33)
MCHC: 30.1 G/DL (ref 32–36)
MCV: 86.3 FL (ref 80–100)
MONOCYTES: 8 %
MPV: 13 FL (ref 7.5–12.5)
NEUTROPHILS: 49.5 %
NON-HDL CHOLESTEROL: 87 MG/DL (CALC)
PLATELET COUNT: 233 THOUSAND/UL (ref 140–400)
POTASSIUM: 4.1 MMOL/L (ref 3.5–5.3)
PROTEIN, TOTAL: 7.5 G/DL (ref 6.1–8.1)
RDW: 14.8 % (ref 11–15)
RED BLOOD CELL COUNT: 4.46 MILLION/UL (ref 3.8–5.1)
SODIUM: 138 MMOL/L (ref 135–146)
T4, FREE: 1 NG/DL (ref 0.8–1.8)
TRIGLYCERIDES: 68 MG/DL
TSH: 3.25 MIU/L
WHITE BLOOD CELL COUNT: 6 THOUSAND/UL (ref 3.8–10.8)

## 2025-08-01 ENCOUNTER — TELEMEDICINE (OUTPATIENT)
Dept: FAMILY MEDICINE CLINIC | Facility: CLINIC | Age: 25
End: 2025-08-01

## 2025-08-01 DIAGNOSIS — R01.2: ICD-10-CM

## 2025-08-01 DIAGNOSIS — D50.0 ANEMIA DUE TO BLOOD LOSS, CHRONIC: ICD-10-CM

## 2025-08-01 DIAGNOSIS — N93.8 DUB (DYSFUNCTIONAL UTERINE BLEEDING): Primary | ICD-10-CM

## 2025-08-01 PROCEDURE — 98005 SYNCH AUDIO-VIDEO EST LOW 20: CPT | Performed by: FAMILY MEDICINE

## 2025-08-01 RX ORDER — FERROUS SULFATE 325(65) MG
TABLET ORAL
Qty: 135 TABLET | Refills: 0 | Status: SHIPPED | OUTPATIENT
Start: 2025-08-01

## (undated) NOTE — LETTER
11/20/24        Aisha Azevedo   [address] [facility] [address]           Dear Aisha Azevedo     Our records indicate that you have outstanding lab work and or testing that was ordered for you and has not yet been completed: Orders to go to [facility] have been enclosed with letter.    Order Code Tests Ordered (Total: 1)    Order Code Tests Ordered      6399 CBC [6399] [Q]         To provide you with the best possible care, please complete these orders at your earliest convenience. If you have recently completed these orders please disregard this letter.     If you have any questions please call the office at [phone].     Thank you,     [facility]